# Patient Record
Sex: FEMALE | Race: WHITE | NOT HISPANIC OR LATINO | Employment: OTHER | URBAN - METROPOLITAN AREA
[De-identification: names, ages, dates, MRNs, and addresses within clinical notes are randomized per-mention and may not be internally consistent; named-entity substitution may affect disease eponyms.]

---

## 2023-02-18 ENCOUNTER — APPOINTMENT (OUTPATIENT)
Dept: GENERAL RADIOLOGY | Facility: CLINIC | Age: 88
End: 2023-02-18
Attending: FAMILY MEDICINE
Payer: MEDICARE

## 2023-02-18 ENCOUNTER — HOSPITAL ENCOUNTER (OUTPATIENT)
Facility: CLINIC | Age: 88
Setting detail: OBSERVATION
Discharge: HOME HEALTH | End: 2023-02-20
Attending: FAMILY MEDICINE | Admitting: INTERNAL MEDICINE
Payer: MEDICARE

## 2023-02-18 ENCOUNTER — HOSPITAL ENCOUNTER (EMERGENCY)
Facility: CLINIC | Age: 88
Discharge: HOME | End: 2023-02-18
Attending: FAMILY MEDICINE | Admitting: FAMILY MEDICINE
Payer: MEDICARE

## 2023-02-18 VITALS
OXYGEN SATURATION: 93 % | SYSTOLIC BLOOD PRESSURE: 206 MMHG | HEART RATE: 60 BPM | TEMPERATURE: 98.5 F | RESPIRATION RATE: 16 BRPM | DIASTOLIC BLOOD PRESSURE: 94 MMHG

## 2023-02-18 DIAGNOSIS — Z78.9 UNABLE TO CARE FOR SELF: ICD-10-CM

## 2023-02-18 DIAGNOSIS — M25.561 RIGHT KNEE PAIN, UNSPECIFIED CHRONICITY: ICD-10-CM

## 2023-02-18 DIAGNOSIS — Z74.1 PERSONAL CARE IMPAIRMENT: ICD-10-CM

## 2023-02-18 DIAGNOSIS — Z79.01 CURRENT USE OF LONG TERM ANTICOAGULATION: Primary | ICD-10-CM

## 2023-02-18 DIAGNOSIS — M25.561 CHRONIC PAIN OF RIGHT KNEE: ICD-10-CM

## 2023-02-18 DIAGNOSIS — G89.29 CHRONIC PAIN OF RIGHT KNEE: ICD-10-CM

## 2023-02-18 LAB
ANION GAP SERPL CALCULATED.3IONS-SCNC: 9 MMOL/L (ref 7–15)
BASOPHILS # BLD AUTO: 0 10E3/UL (ref 0–0.2)
BASOPHILS NFR BLD AUTO: 1 %
BUN SERPL-MCNC: 29.4 MG/DL (ref 8–23)
CALCIUM SERPL-MCNC: 9.2 MG/DL (ref 8.2–9.6)
CHLORIDE SERPL-SCNC: 104 MMOL/L (ref 98–107)
CREAT SERPL-MCNC: 0.78 MG/DL (ref 0.51–0.95)
DEPRECATED HCO3 PLAS-SCNC: 28 MMOL/L (ref 22–29)
EOSINOPHIL # BLD AUTO: 0.1 10E3/UL (ref 0–0.7)
EOSINOPHIL NFR BLD AUTO: 1 %
ERYTHROCYTE [DISTWIDTH] IN BLOOD BY AUTOMATED COUNT: 12.4 % (ref 10–15)
GFR SERPL CREATININE-BSD FRML MDRD: 68 ML/MIN/1.73M2
GLUCOSE SERPL-MCNC: 107 MG/DL (ref 70–99)
HCT VFR BLD AUTO: 35.1 % (ref 35–47)
HGB BLD-MCNC: 11.4 G/DL (ref 11.7–15.7)
IMM GRANULOCYTES # BLD: 0 10E3/UL
IMM GRANULOCYTES NFR BLD: 0 %
INR PPP: 2.78 (ref 0.85–1.15)
LYMPHOCYTES # BLD AUTO: 1.1 10E3/UL (ref 0.8–5.3)
LYMPHOCYTES NFR BLD AUTO: 14 %
MCH RBC QN AUTO: 32.5 PG (ref 26.5–33)
MCHC RBC AUTO-ENTMCNC: 32.5 G/DL (ref 31.5–36.5)
MCV RBC AUTO: 100 FL (ref 78–100)
MONOCYTES # BLD AUTO: 0.6 10E3/UL (ref 0–1.3)
MONOCYTES NFR BLD AUTO: 8 %
NEUTROPHILS # BLD AUTO: 6.1 10E3/UL (ref 1.6–8.3)
NEUTROPHILS NFR BLD AUTO: 76 %
NRBC # BLD AUTO: 0 10E3/UL
NRBC BLD AUTO-RTO: 0 /100
PLATELET # BLD AUTO: 206 10E3/UL (ref 150–450)
POTASSIUM SERPL-SCNC: 4.5 MMOL/L (ref 3.4–5.3)
RBC # BLD AUTO: 3.51 10E6/UL (ref 3.8–5.2)
SODIUM SERPL-SCNC: 141 MMOL/L (ref 136–145)
WBC # BLD AUTO: 8 10E3/UL (ref 4–11)

## 2023-02-18 PROCEDURE — 80048 BASIC METABOLIC PNL TOTAL CA: CPT | Performed by: FAMILY MEDICINE

## 2023-02-18 PROCEDURE — G0378 HOSPITAL OBSERVATION PER HR: HCPCS

## 2023-02-18 PROCEDURE — 96374 THER/PROPH/DIAG INJ IV PUSH: CPT | Performed by: FAMILY MEDICINE

## 2023-02-18 PROCEDURE — 250N000011 HC RX IP 250 OP 636: Performed by: INTERNAL MEDICINE

## 2023-02-18 PROCEDURE — 36415 COLL VENOUS BLD VENIPUNCTURE: CPT | Performed by: FAMILY MEDICINE

## 2023-02-18 PROCEDURE — 96375 TX/PRO/DX INJ NEW DRUG ADDON: CPT | Performed by: FAMILY MEDICINE

## 2023-02-18 PROCEDURE — 250N000013 HC RX MED GY IP 250 OP 250 PS 637: Performed by: FAMILY MEDICINE

## 2023-02-18 PROCEDURE — 73562 X-RAY EXAM OF KNEE 3: CPT | Mod: RT

## 2023-02-18 PROCEDURE — 85025 COMPLETE CBC W/AUTO DIFF WBC: CPT | Performed by: FAMILY MEDICINE

## 2023-02-18 PROCEDURE — 99221 1ST HOSP IP/OBS SF/LOW 40: CPT | Mod: AI | Performed by: INTERNAL MEDICINE

## 2023-02-18 PROCEDURE — 250N000011 HC RX IP 250 OP 636: Performed by: FAMILY MEDICINE

## 2023-02-18 PROCEDURE — 85610 PROTHROMBIN TIME: CPT | Performed by: FAMILY MEDICINE

## 2023-02-18 PROCEDURE — 99285 EMERGENCY DEPT VISIT HI MDM: CPT | Mod: 25 | Performed by: FAMILY MEDICINE

## 2023-02-18 PROCEDURE — 99285 EMERGENCY DEPT VISIT HI MDM: CPT | Performed by: FAMILY MEDICINE

## 2023-02-18 PROCEDURE — 96374 THER/PROPH/DIAG INJ IV PUSH: CPT

## 2023-02-18 PROCEDURE — 99283 EMERGENCY DEPT VISIT LOW MDM: CPT | Performed by: FAMILY MEDICINE

## 2023-02-18 RX ORDER — LISINOPRIL 20 MG/1
20 TABLET ORAL 2 TIMES DAILY
COMMUNITY
Start: 2022-05-31

## 2023-02-18 RX ORDER — SENNOSIDES 8.6 MG
1300 CAPSULE ORAL 2 TIMES DAILY
COMMUNITY
Start: 2021-10-08

## 2023-02-18 RX ORDER — HYDROMORPHONE HCL IN WATER/PF 6 MG/30 ML
0.2 PATIENT CONTROLLED ANALGESIA SYRINGE INTRAVENOUS ONCE
Status: COMPLETED | OUTPATIENT
Start: 2023-02-18 | End: 2023-02-18

## 2023-02-18 RX ORDER — ACETAMINOPHEN 500 MG
1000 TABLET ORAL ONCE
Status: COMPLETED | OUTPATIENT
Start: 2023-02-18 | End: 2023-02-18

## 2023-02-18 RX ORDER — LACTULOSE 10 G/15ML
30 SOLUTION ORAL DAILY PRN
COMMUNITY
Start: 2021-11-18

## 2023-02-18 RX ORDER — CARVEDILOL 6.25 MG/1
6.25 TABLET ORAL ONCE
Status: COMPLETED | OUTPATIENT
Start: 2023-02-18 | End: 2023-02-18

## 2023-02-18 RX ORDER — MORPHINE SULFATE 2 MG/ML
2 INJECTION, SOLUTION INTRAMUSCULAR; INTRAVENOUS EVERY 4 HOURS PRN
Status: COMPLETED | OUTPATIENT
Start: 2023-02-18 | End: 2023-02-18

## 2023-02-18 RX ORDER — ACETAMINOPHEN 650 MG/1
650 SUPPOSITORY RECTAL EVERY 6 HOURS PRN
Status: DISCONTINUED | OUTPATIENT
Start: 2023-02-18 | End: 2023-02-20 | Stop reason: HOSPADM

## 2023-02-18 RX ORDER — KETOCONAZOLE 20 MG/G
CREAM TOPICAL PRN
COMMUNITY
Start: 2021-11-08 | End: 2024-01-01

## 2023-02-18 RX ORDER — ONDANSETRON 2 MG/ML
4 INJECTION INTRAMUSCULAR; INTRAVENOUS EVERY 6 HOURS PRN
Status: DISCONTINUED | OUTPATIENT
Start: 2023-02-18 | End: 2023-02-20 | Stop reason: HOSPADM

## 2023-02-18 RX ORDER — FLECAINIDE ACETATE 50 MG/1
1 TABLET ORAL DAILY
Status: ON HOLD | COMMUNITY
Start: 2022-05-23 | End: 2023-02-20

## 2023-02-18 RX ORDER — AMOXICILLIN 250 MG
1 CAPSULE ORAL
COMMUNITY

## 2023-02-18 RX ORDER — WARFARIN SODIUM 7.5 MG/1
3.75 TABLET ORAL AT BEDTIME
COMMUNITY
End: 2024-01-01

## 2023-02-18 RX ORDER — FENTANYL CITRATE 50 UG/ML
25 INJECTION, SOLUTION INTRAMUSCULAR; INTRAVENOUS
Status: DISCONTINUED | OUTPATIENT
Start: 2023-02-18 | End: 2023-02-18 | Stop reason: HOSPADM

## 2023-02-18 RX ORDER — ACETAMINOPHEN 325 MG/1
650 TABLET ORAL EVERY 6 HOURS PRN
Status: DISCONTINUED | OUTPATIENT
Start: 2023-02-18 | End: 2023-02-20 | Stop reason: HOSPADM

## 2023-02-18 RX ORDER — LISINOPRIL 10 MG/1
20 TABLET ORAL ONCE
Status: COMPLETED | OUTPATIENT
Start: 2023-02-18 | End: 2023-02-18

## 2023-02-18 RX ORDER — ONDANSETRON 4 MG/1
4 TABLET, ORALLY DISINTEGRATING ORAL EVERY 6 HOURS PRN
Status: DISCONTINUED | OUTPATIENT
Start: 2023-02-18 | End: 2023-02-20 | Stop reason: HOSPADM

## 2023-02-18 RX ORDER — HYDROCODONE BITARTRATE AND ACETAMINOPHEN 5; 325 MG/1; MG/1
1 TABLET ORAL
Qty: 12 TABLET | Refills: 0 | Status: SHIPPED | OUTPATIENT
Start: 2023-02-18

## 2023-02-18 RX ORDER — NALOXONE HYDROCHLORIDE 0.4 MG/ML
0.4 INJECTION, SOLUTION INTRAMUSCULAR; INTRAVENOUS; SUBCUTANEOUS
Status: DISCONTINUED | OUTPATIENT
Start: 2023-02-18 | End: 2023-02-20 | Stop reason: HOSPADM

## 2023-02-18 RX ORDER — WARFARIN SODIUM 2.5 MG/1
2.5 TABLET ORAL AT BEDTIME
COMMUNITY
Start: 2022-12-20 | End: 2023-06-18

## 2023-02-18 RX ORDER — ALBUTEROL SULFATE 90 UG/1
4 AEROSOL, METERED RESPIRATORY (INHALATION)
COMMUNITY
Start: 2021-10-11

## 2023-02-18 RX ORDER — CARVEDILOL 6.25 MG/1
6.25 TABLET ORAL 2 TIMES DAILY WITH MEALS
Status: DISCONTINUED | OUTPATIENT
Start: 2023-02-19 | End: 2023-02-20 | Stop reason: HOSPADM

## 2023-02-18 RX ORDER — NALOXONE HYDROCHLORIDE 0.4 MG/ML
0.2 INJECTION, SOLUTION INTRAMUSCULAR; INTRAVENOUS; SUBCUTANEOUS
Status: DISCONTINUED | OUTPATIENT
Start: 2023-02-18 | End: 2023-02-20 | Stop reason: HOSPADM

## 2023-02-18 RX ORDER — HYDRALAZINE HYDROCHLORIDE 10 MG/1
10 TABLET, FILM COATED ORAL 3 TIMES DAILY
Status: ON HOLD | COMMUNITY
Start: 2022-05-31 | End: 2023-02-20

## 2023-02-18 RX ORDER — LORATADINE 5 MG/5 ML
SOLUTION, ORAL ORAL PRN
COMMUNITY
Start: 2021-07-13

## 2023-02-18 RX ORDER — CARVEDILOL 6.25 MG/1
6.25 TABLET ORAL 2 TIMES DAILY WITH MEALS
COMMUNITY
Start: 2022-05-31 | End: 2024-01-01 | Stop reason: DRUGHIGH

## 2023-02-18 RX ORDER — POLYETHYLENE GLYCOL 3350 17 G/17G
17 POWDER, FOR SOLUTION ORAL DAILY PRN
COMMUNITY
Start: 2021-10-08

## 2023-02-18 RX ORDER — HYDRALAZINE HYDROCHLORIDE 10 MG/1
10 TABLET, FILM COATED ORAL 3 TIMES DAILY
Status: DISCONTINUED | OUTPATIENT
Start: 2023-02-19 | End: 2023-02-20 | Stop reason: HOSPADM

## 2023-02-18 RX ORDER — LISINOPRIL 10 MG/1
20 TABLET ORAL 2 TIMES DAILY
Status: DISCONTINUED | OUTPATIENT
Start: 2023-02-19 | End: 2023-02-20 | Stop reason: HOSPADM

## 2023-02-18 RX ADMIN — MORPHINE SULFATE 2 MG: 2 INJECTION, SOLUTION INTRAMUSCULAR; INTRAVENOUS at 23:42

## 2023-02-18 RX ADMIN — HYDROMORPHONE HYDROCHLORIDE 0.2 MG: 0.2 INJECTION, SOLUTION INTRAMUSCULAR; INTRAVENOUS; SUBCUTANEOUS at 09:23

## 2023-02-18 RX ADMIN — ACETAMINOPHEN 1000 MG: 500 TABLET ORAL at 07:32

## 2023-02-18 RX ADMIN — Medication: at 22:54

## 2023-02-18 RX ADMIN — LISINOPRIL 20 MG: 10 TABLET ORAL at 09:23

## 2023-02-18 RX ADMIN — FENTANYL CITRATE 25 MCG: 50 INJECTION INTRAMUSCULAR; INTRAVENOUS at 07:33

## 2023-02-18 RX ADMIN — CARVEDILOL 6.25 MG: 6.25 TABLET, FILM COATED ORAL at 09:46

## 2023-02-18 ASSESSMENT — ACTIVITIES OF DAILY LIVING (ADL)
ADLS_ACUITY_SCORE: 35
ADLS_ACUITY_SCORE: 39
DEPENDENT_IADLS:: CLEANING;COOKING;LAUNDRY;SHOPPING;MEAL PREPARATION;MEDICATION MANAGEMENT;MONEY MANAGEMENT;TRANSPORTATION
ADLS_ACUITY_SCORE: 35

## 2023-02-18 NOTE — ED NOTES
Updated patient's niece, Paz (POA) and Alexy from [facility]. Both stated patient has services at the assisted living to ensure her safety and well-being, including options to add services as needed. Both updated on social work consult to help with resources for patient.

## 2023-02-18 NOTE — ED NOTES
ACE wrap applied to right knee. Pt requested second wrap around lower leg to help with pain. CMS intact.

## 2023-02-18 NOTE — LETTER
"Transition Communication Hand-off for Care Transitions to Next Level of Care Provider    Name: Radha Hennessy  : 1924  MRN #: [id number]  Primary Care Provider: Physician No Ref-Primary     Primary Clinic: No address on file     Reason for Hospitalization:  Right knee pain, unspecified chronicity [M25.561]  Unable to care for self [Z78.9]  Admit Date/Time: 2023  7:55 PM  Discharge Date: ***  Payor Source: Payor: [facility] / Plan: [facility] MEDICARE ADVANTAGE / Product Type: Medicare /     Readmission Assessment Measure (LAILA) Risk Score/category: ***    Plan of Care Goals/Milestone Events:   Patient Concerns: ***   Patient Goals:   Short-term ***   Long-term ***   Medical Goals   Short-term ***   Long-term ***         Reason for Communication Hand-off Referral: {CCREASONCMCTNHANDOFFREFERRALCTS:98560}    Discharge Plan:       Concern for non-adherence with plan of care:   Y/N ***  Discharge Needs Assessment:  Needs    Flowsheet Row Most Recent Value   Equipment Currently Used at Home grab bar, toilet, raised toilet seat, wheelchair, manual, other (see comments)          Already enrolled in Tele-monitoring program and name of program:  ***  Follow-up specialty is recommended: {YES / NO:30921::\"Yes\"}    Follow-up plan:  No future appointments.    Any outstanding tests or procedures:              Key Recommendations:      Grecia Cox    AVS/Discharge Summary is the source of truth; this is a helpful guide for improved communication of patient story          "

## 2023-02-18 NOTE — ED TRIAGE NOTES
Patient arrives via EMS for concerns of right knee pain. States she was reaching up to change a light when she felt her knee pop, stopped what she was doing and noted the pain starts mid-femur and goes down to the foot. No notable swelling or bruising to extremity, is tender to touch.

## 2023-02-18 NOTE — CONSULTS
Care Management Initial Consult    General Information  Assessment completed with: Caregiver, Family, Paz  Type of CM/SW Visit: Initial Assessment    Primary Care Provider verified and updated as needed:     Readmission within the last 30 days:        Reason for Consult: discharge planning  Advance Care Planning:         Communication Assessment  Patient's communication style: spoken language (English or Bilingual)           Cognitive  Cognitive/Neuro/Behavioral:                        Living Environment:   People in home: spouse     Current living Arrangements: assisted living  Name of Facility: [facility]   Able to return to prior arrangements: yes       Family/Social Support:  Care provided by: self, other (see comments) (staff)  Provides care for:    Marital Status:   Other (specify) (Niece)          Description of Support System: Supportive, Involved    Support Assessment: Adequate family and caregiver support    Current Resources:   Patient receiving home care services: No     Community Resources: None  Equipment currently used at home: walker, rolling  Supplies currently used at home:      Employment/Financial:  Employment Status: retired        Financial Concerns:         Lifestyle & Psychosocial Needs:  Social Determinants of Health     Tobacco Use: Not on file   Alcohol Use: Not on file   Financial Resource Strain: Not on file   Food Insecurity: Not on file   Transportation Needs: Not on file   Physical Activity: Not on file   Stress: Not on file   Social Connections: Not on file   Intimate Partner Violence: Not on file   Depression: Not on file   Housing Stability: Not on file       Functional Status:  Prior to admission patient needed assistance:   Dependent ADLs:: Ambulation-walker  Dependent IADLs:: Cleaning, Cooking, Laundry, Shopping, Meal Preparation, Medication Management, Money Management, Transportation       Mental Health Status:          Chemical Dependency Status:               Values/Beliefs:  Spiritual, Cultural Beliefs, Quaker Practices, Values that affect care: no               Additional Information:  Referral received for placement/resources/transportation.      Patient lives at [facility] with her .  They just moved to [facility] 3 wks ago.    Spoke with niece, Paz.  Paz states they are angry about moving from their home.  Patient is giving staff a hard time at the [facility].  Patient is refusing to pay for increased cares.  Paz has been trying to reason with them that they need to be in the [facility], but has not been successful.  Paz is overwhelmed.  Paz states she is also [facility].  Patient's spouse is a .    Patient not needing to admit to the hospital.  Alexy, at [facility], is ok taking patient back today.  Alexy will try again to increase services with patient so she gets more cares.  Patient will need to agree.    Arranged transport back to [facility] with [facility].  [facility] to transport @ 1330.  Paz is aware of the private cost.  Pollo Moyer, will contact Paz for payment by phone.    JOHN Hernandez nurse, updated on plan.    DANTE Holloway  [facility] [phone]/ [facility] [phone]  Care Management

## 2023-02-18 NOTE — DISCHARGE INSTRUCTIONS
1.  You probably have a little bit more bleeding into your joint which is causing your worsening pain.  Keep a wrap on your knee, ice it and continue to use Tylenol for pain.  I have prescribed you some hydrocodone that I would only use at night to help you sleep.  I have put a referral in for orthopedics for you to consider a possible joint injection as this may help with your pain also.

## 2023-02-19 ENCOUNTER — APPOINTMENT (OUTPATIENT)
Dept: PHYSICAL THERAPY | Facility: CLINIC | Age: 88
End: 2023-02-19
Attending: INTERNAL MEDICINE
Payer: MEDICARE

## 2023-02-19 LAB
ALBUMIN SERPL BCG-MCNC: 3.7 G/DL (ref 3.5–5.2)
ALP SERPL-CCNC: 61 U/L (ref 35–104)
ALT SERPL W P-5'-P-CCNC: 9 U/L (ref 10–35)
ANION GAP SERPL CALCULATED.3IONS-SCNC: 11 MMOL/L (ref 7–15)
AST SERPL W P-5'-P-CCNC: 26 U/L (ref 10–35)
BILIRUB SERPL-MCNC: 0.7 MG/DL
BUN SERPL-MCNC: 25.4 MG/DL (ref 8–23)
CALCIUM SERPL-MCNC: 9 MG/DL (ref 8.2–9.6)
CHLORIDE SERPL-SCNC: 105 MMOL/L (ref 98–107)
CREAT SERPL-MCNC: 0.67 MG/DL (ref 0.51–0.95)
DEPRECATED HCO3 PLAS-SCNC: 25 MMOL/L (ref 22–29)
ERYTHROCYTE [DISTWIDTH] IN BLOOD BY AUTOMATED COUNT: 12.4 % (ref 10–15)
GFR SERPL CREATININE-BSD FRML MDRD: 79 ML/MIN/1.73M2
GLUCOSE SERPL-MCNC: 93 MG/DL (ref 70–99)
HCT VFR BLD AUTO: 35.7 % (ref 35–47)
HGB BLD-MCNC: 11.3 G/DL (ref 11.7–15.7)
INR PPP: 2.52 (ref 0.85–1.15)
MCH RBC QN AUTO: 31.9 PG (ref 26.5–33)
MCHC RBC AUTO-ENTMCNC: 31.7 G/DL (ref 31.5–36.5)
MCV RBC AUTO: 101 FL (ref 78–100)
PLATELET # BLD AUTO: 197 10E3/UL (ref 150–450)
POTASSIUM SERPL-SCNC: 3.9 MMOL/L (ref 3.4–5.3)
PROT SERPL-MCNC: 6 G/DL (ref 6.4–8.3)
RBC # BLD AUTO: 3.54 10E6/UL (ref 3.8–5.2)
SODIUM SERPL-SCNC: 141 MMOL/L (ref 136–145)
WBC # BLD AUTO: 5.8 10E3/UL (ref 4–11)

## 2023-02-19 PROCEDURE — 85610 PROTHROMBIN TIME: CPT | Performed by: INTERNAL MEDICINE

## 2023-02-19 PROCEDURE — G0378 HOSPITAL OBSERVATION PER HR: HCPCS

## 2023-02-19 PROCEDURE — 80053 COMPREHEN METABOLIC PANEL: CPT | Performed by: INTERNAL MEDICINE

## 2023-02-19 PROCEDURE — 97530 THERAPEUTIC ACTIVITIES: CPT | Mod: GP | Performed by: PHYSICAL THERAPIST

## 2023-02-19 PROCEDURE — 97161 PT EVAL LOW COMPLEX 20 MIN: CPT | Mod: GP | Performed by: PHYSICAL THERAPIST

## 2023-02-19 PROCEDURE — 99231 SBSQ HOSP IP/OBS SF/LOW 25: CPT | Performed by: HOSPITALIST

## 2023-02-19 PROCEDURE — 250N000013 HC RX MED GY IP 250 OP 250 PS 637: Performed by: INTERNAL MEDICINE

## 2023-02-19 PROCEDURE — 36415 COLL VENOUS BLD VENIPUNCTURE: CPT | Performed by: INTERNAL MEDICINE

## 2023-02-19 PROCEDURE — 85027 COMPLETE CBC AUTOMATED: CPT | Performed by: INTERNAL MEDICINE

## 2023-02-19 PROCEDURE — 250N000013 HC RX MED GY IP 250 OP 250 PS 637: Performed by: HOSPITALIST

## 2023-02-19 RX ORDER — OXYCODONE AND ACETAMINOPHEN 5; 325 MG/1; MG/1
1 TABLET ORAL
Status: DISCONTINUED | OUTPATIENT
Start: 2023-02-19 | End: 2023-02-20 | Stop reason: HOSPADM

## 2023-02-19 RX ORDER — BISACODYL 10 MG
10 SUPPOSITORY, RECTAL RECTAL DAILY PRN
Status: DISCONTINUED | OUTPATIENT
Start: 2023-02-19 | End: 2023-02-20 | Stop reason: HOSPADM

## 2023-02-19 RX ORDER — AMOXICILLIN 250 MG
1 CAPSULE ORAL 2 TIMES DAILY PRN
Status: DISCONTINUED | OUTPATIENT
Start: 2023-02-19 | End: 2023-02-20 | Stop reason: HOSPADM

## 2023-02-19 RX ORDER — OXYCODONE AND ACETAMINOPHEN 5; 325 MG/1; MG/1
1 TABLET ORAL
Status: COMPLETED | OUTPATIENT
Start: 2023-02-19 | End: 2023-02-19

## 2023-02-19 RX ORDER — MORPHINE SULFATE 2 MG/ML
2 INJECTION, SOLUTION INTRAMUSCULAR; INTRAVENOUS EVERY 4 HOURS PRN
Status: DISCONTINUED | OUTPATIENT
Start: 2023-02-19 | End: 2023-02-20 | Stop reason: HOSPADM

## 2023-02-19 RX ORDER — MORPHINE SULFATE 2 MG/ML
2 INJECTION, SOLUTION INTRAMUSCULAR; INTRAVENOUS EVERY 4 HOURS PRN
Status: DISCONTINUED | OUTPATIENT
Start: 2023-02-19 | End: 2023-02-19

## 2023-02-19 RX ADMIN — OXYCODONE HYDROCHLORIDE AND ACETAMINOPHEN 1 TABLET: 5; 325 TABLET ORAL at 01:59

## 2023-02-19 RX ADMIN — BISACODYL 10 MG: 10 SUPPOSITORY RECTAL at 10:19

## 2023-02-19 RX ADMIN — LISINOPRIL 20 MG: 10 TABLET ORAL at 22:21

## 2023-02-19 RX ADMIN — HYDRALAZINE HYDROCHLORIDE 10 MG: 10 TABLET, FILM COATED ORAL at 08:13

## 2023-02-19 RX ADMIN — LISINOPRIL 20 MG: 10 TABLET ORAL at 08:13

## 2023-02-19 RX ADMIN — ACETAMINOPHEN 650 MG: 325 TABLET ORAL at 22:32

## 2023-02-19 RX ADMIN — ACETAMINOPHEN 650 MG: 325 TABLET ORAL at 08:12

## 2023-02-19 RX ADMIN — WARFARIN SODIUM 3.75 MG: 2.5 TABLET ORAL at 18:46

## 2023-02-19 RX ADMIN — CARVEDILOL 6.25 MG: 6.25 TABLET, FILM COATED ORAL at 08:13

## 2023-02-19 RX ADMIN — HYDRALAZINE HYDROCHLORIDE 10 MG: 10 TABLET, FILM COATED ORAL at 22:21

## 2023-02-19 RX ADMIN — CARVEDILOL 6.25 MG: 6.25 TABLET, FILM COATED ORAL at 18:47

## 2023-02-19 RX ADMIN — SENNOSIDES AND DOCUSATE SODIUM 1 TABLET: 50; 8.6 TABLET ORAL at 22:21

## 2023-02-19 ASSESSMENT — ACTIVITIES OF DAILY LIVING (ADL)
ADLS_ACUITY_SCORE: 44
ADLS_ACUITY_SCORE: 45
ADLS_ACUITY_SCORE: 44
ADLS_ACUITY_SCORE: 45
ADLS_ACUITY_SCORE: 44
ADLS_ACUITY_SCORE: 45
ADLS_ACUITY_SCORE: 44
ADLS_ACUITY_SCORE: 45
DEPENDENT_IADLS:: CLEANING;COOKING;LAUNDRY;SHOPPING;MEAL PREPARATION;MEDICATION MANAGEMENT;MONEY MANAGEMENT;TRANSPORTATION
ADLS_ACUITY_SCORE: 44
ADLS_ACUITY_SCORE: 44

## 2023-02-19 NOTE — CONSULTS
Care Management Initial Consult    General Information  Assessment completed with: Caregiver, Family,    Type of CM/SW Visit: Initial Assessment    Primary Care Provider verified and updated as needed:     Readmission within the last 30 days:        Reason for Consult: discharge planning  Advance Care Planning:          Communication Assessment  Patient's communication style: spoken language (English or Bilingual)    Hearing Difficulty or Deaf: yes        Cognitive  Cognitive/Neuro/Behavioral: WDL                      Living Environment:   People in home: spouse     Current living Arrangements: assisted living  Name of Facility: [facility]   Able to return to prior arrangements: yes     Family/Social Support:  Care provided by: self, other (see comments) (staff)  Provides care for:    Marital Status:   , Facility resident(s)/Staff, Other (specify) (niece)          Description of Support System: Supportive, Involved    Support Assessment: Adequate family and caregiver support    Current Resources:   Patient receiving home care services: No     Community Resources: None  Equipment currently used at home: grab bar, toilet, raised toilet seat, wheelchair, manual, other (see comments)  Supplies currently used at home:      Employment/Financial:  Employment Status: retired        Financial Concerns:         Lifestyle & Psychosocial Needs:  Social Determinants of Health     Tobacco Use: Not on file   Alcohol Use: Not on file   Financial Resource Strain: Not on file   Food Insecurity: Not on file   Transportation Needs: Not on file   Physical Activity: Not on file   Stress: Not on file   Social Connections: Not on file   Intimate Partner Violence: Not on file   Depression: Not on file   Housing Stability: Not on file       Functional Status:  Prior to admission patient needed assistance:   Dependent ADLs:: Wheelchair-independent  Dependent IADLs:: Cleaning, Cooking, Laundry, Shopping, Meal Preparation,  Medication Management, Money Management, Transportation       Mental Health Status:          Chemical Dependency Status:              Values/Beliefs:  Spiritual, Cultural Beliefs, Tenriism Practices, Values that affect care: no               Additional Information:  Referral received for discharge planning.  Initial assessment completed with patient and Paz salazar (yesterday).  Patient was in the ED yesterday and when she returned home, she came back to the ED with continued pain in her knee.    Patient lives at [facility].  She lives with her .  They just moved in about 3 weeks ago.  Patient upset about having to leave her home in the [facility] area.    Physical therapy evaluated and recommended TCU.  However, patient states she does not ambulate at home.  She scoots herself around in a wheelchair.  Therefore, patient is declining TCU and stated she will be able to return to [facility].     Will discuss transport with Paz salazar.    Update 3373- Spoke with patient again.  Patient now states that she spoke to an aide at [facility] who states they can meet patient's need back at the [facility].  Writer attempted to call on-call nurse, Alexy, from [facility] to verify.  Alexy did not answer and her voicemail box is full.    Spoke with patient's Paz salazar.  Paz states understanding that there needs to be a solid discharge plan in place for patient.  Paz plans to talk with Sandra or Alexy at [facility] in the morning and get costs of how much it will be to increase patient's cares at [facility] and whether they can manage her cares.  Paz will call writer after a decision has been in regard to patient returning to [facility] vs TCU.    TCU referrals are pending at:    - [facility] (Main Phone: [phone] Admissions Phone: [phone] Fax: [phone])    - [facility] (Phone: [phone] Fax: [phone])    Update 3380 - Spoke  with TIMUR Tracey, at [facility].  Alexy states they ARE able to manage patient's needs back at [facility].  Alexy will discuss increase in costs with patient's niece, Paz.  Plan for discharge back to [facility] tomorrow morning.  Paz will call with transport time.    DANTE Holloway  [facility] [phone]/ [facility] [phone]  Care Management

## 2023-02-19 NOTE — H&P
[facility]    History and Physical - Hospitalist Service       Date of Admission:  2/18/2023    Assessment & Plan      Radha Hennessy is a 89 year old female admitted on 2/18/2023 for right knee pain.    Right knee pain. Admit to medical observation. Knee XR negative for any fracture. Likely muscular/tendon problem. Will give pain medication and observe, PT/OT and possible placement.    History of paroxysmal afib. Continue home Coumadin (INR goal 2-3).    HTN. Monitor BP and resume home medications.      DVT PPX. SCDs + coumadin.    Code status full code (Will need to confirm in AM again as patient unsure)    Disposition home in 1-2 days    The patient's care was discussed with the bedside nursing staff        Jeffrey Gibson MD  [facility]  Securely message with the Vocera Web Console (learn more here)  Text page via Teliportme Paging/Directory      Visit/Communication Style   Virtual (Video) communication was used to evaluate Radha.  Radha consented to the use of video communication: yes  Video START time: 2300, 2/18/2023  Video STOP time: 2330, 2/18/2023   Patient's location: [facility]   Provider's location during the visit: [facility] Tele-medicine site        ______________________________________________________________________    Chief Complaint   Knee pain    History is obtained from the patient    History of Present Illness   Radha Hennessy is a 89 year old female with past medical history of HTN and paroxysmal afib (on Coumadin), presenting to the ER because of knee pain. Last night, the patient got up, twisted the knee and started to have knee pain. The patient was brought to the ER next morning due to severe pain and inability to move around. The patient was discharged with some oral pain medications but as she was going back home, the pain got worse and the patient did not believe going back is a  good option. The patient decided to go back to the ER, which is where we are now.    Otherwise, the patient denies any fever, chills, nausea, vomiting, diarrhea, chest pain, shortness of breath or coughing.    In our ER,  the patient was hemodynamically stable.  Note in the first ER visit, the patient was hypertensive with BP up to 218/102. Lisinopril + carvedilol given and BP came down. Knee XR came back with no sign of fracture. The patient is currently living in assisted living facility with her  but stated that the facility does not provide good support, especially now with her knee pain and limited mobility. Will likely need placement.    Review of Systems    General: negative for fever, chills, sweats, weakness  Eyes: negative for blurred vision, loss of vision  Ear Nose and Throat: negative for pharyngitis, speech or swallowing difficulties  Respiratory:  negative for sputum production, wheezing, VERDE, pleuritic pain, sob or cough  Cardiology:  negative for chest pain, palpitations, orthopnea, PND, edema, syncope   Gastrointestinal: negative for abdominal pain, nausea, vomiting, diarrhea, constipation, hematemesis, melena or hematochezia  Genitourinary: negative for frequency, urgency, dysuria, hematuria   Neurological: negative for focal weakness, paresthesia    Past Medical History    I have reviewed this patient's medical history and updated it with pertinent information if needed.   No past medical history on file.  Afib    Past Surgical History   I have reviewed this patient's surgical history and updated it with pertinent information if needed.  No past surgical history on file.   Patient unable to recall    Social History   I have reviewed this patient's social history and updated it with pertinent information if needed.       Family History   Patient unable to recall    Prior to Admission Medications   Prior to Admission Medications   Prescriptions Last Dose Informant Patient Reported? Taking?  "  Diaper Rash Products (A+D DIAPER RASH) CREA   Yes No   HYDROcodone-acetaminophen (NORCO) 5-325 MG tablet   No No   Sig: Take 1 tablet by mouth nightly as needed for severe pain (7-10)   acetaminophen (TYLENOL) 650 MG CR tablet   Yes No   Sig: Take 1,300 mg by mouth   albuterol (PROAIR HFA/PROVENTIL HFA/VENTOLIN HFA) 108 (90 Base) MCG/ACT inhaler   Yes No   Sig: Inhale 2 puffs into the lungs   calcium carbonate antacid 1000 MG CHEW   Yes No   Sig: Take 400 mg by mouth   carvedilol (COREG) 6.25 MG tablet   Yes No   Sig: Take 6.25 mg by mouth   cholecalciferol (VITAMIN D3) 25 mcg (1000 units) capsule   Yes No   Sig: Take 1 capsule by mouth every evening   flecainide (TAMBOCOR) 50 MG tablet   Yes No   Sig: Take 1 tablet by mouth   hydrALAZINE (APRESOLINE) 10 MG tablet   Yes No   Sig: Take 10 mg by mouth   ketoconazole (NIZORAL) 2 % external cream   Yes No   lactulose (CHRONULAC) 10 GM/15ML solution   Yes No   Sig: Take 30 mLs by mouth   lisinopril (ZESTRIL) 20 MG tablet   Yes No   Sig: Take 20 mg by mouth   omeprazole (PRILOSEC) 20 MG DR capsule   Yes No   Sig: Take 20 mg by mouth   polyethylene glycol (MIRALAX) 17 GM/Dose powder   Yes No   Sig: Take 17 g by mouth   senna-docusate (SENOKOT-S/PERICOLACE) 8.6-50 MG tablet   Yes No   Sig: Take 2 tablets by mouth   vitamin B-12 (CYANOCOBALAMIN) 1000 MCG tablet   Yes No   Sig: Take 1,000 mcg by mouth   warfarin ANTICOAGULANT (COUMADIN) 2.5 MG tablet   Yes No   Sig: Monday & Friday   warfarin ANTICOAGULANT (COUMADIN) 7.5 MG tablet   Yes No   Sig: Take 7.5 mg by mouth Sunday, Tuesday, Wednesday, Thursday, Saturday      Facility-Administered Medications: None     Allergies   Allergies   Allergen Reactions     Cefuroxime Diarrhea     Hydrochlorothiazide Other (See Comments)     hyponatremia     Nitrofurantoin Other (See Comments)     Dizzy, headache, \"sick all over\"     Oxybutynin Other (See Comments)     Dizziness ,lightheaded, urine retention on 10 mg dose     Sulfa Drugs  "     Amoxicillin Other (See Comments)     Diarrhea       Physical Exam   Vital Signs: Temp: 98.1  F (36.7  C) Temp src: Oral BP: 131/69 Pulse: 60   Resp: 16 SpO2: 93 %      Weight: 134 lbs 11.2 oz      GENERAL: The patient is not in any acute distressed. Awake and alert.  HEENT: Nonicteric sclerae, PERRLA, EOMI. Oropharynx clear. Moist mucous membranes. Conjunctivae appear well perfused.  HEART: Regular rate and rhythm without murmurs. No lower extremities edema.  LUNGS: Clear to auscultation bilaterally. No wheezing, crackles or rhonchi  ABDOMEN: Soft, positive bowel sounds, nontender.  SKIN: No rash, no excessive bruising, petechiae, or purpura.  NEUROLOGIC: AxO x 3.  Cranial nerves II-XII intact without motor/sensory deficit.  Limited movement in right leg due to right knee pain    Data     Recent Labs   Lab 02/18/23  2110 02/18/23  0725   WBC 8.0  --    HGB 11.4*  --      --      --    INR  --  2.78*         Recent Results (from the past 24 hour(s))   XR Knee Right 3 Views    Narrative    EXAM: XR KNEE RIGHT 3 VIEWS  LOCATION: [facility]  DATE/TIME: 2/18/2023 8:00 AM    INDICATION: right knee pain, swelling  COMPARISON: None.      Impression    IMPRESSION: No acute fracture or malalignment. Moderate patellofemoral, mild medial, and mild lateral compartment degenerative changes. Prominent subchondral cyst in the lateral tibial plateau. Large knee joint effusion and chondrocalcinosis. Osteopenia.   Atherosclerosis.

## 2023-02-19 NOTE — ED PROVIDER NOTES
History     Chief Complaint   Patient presents with     Leg Pain     Leg Swelling     HPI  Radha Hennessy is a 89 year old female who presents back to the ED this evening with worsening right knee pain.  She was seen earlier this morning after she presented by ambulance.  She had gotten up during the night and twisted her knee and by morning had such severe knee pain she could not get around her apartment.  X-rays were negative for any acute fracture.  They did note a large effusion but she is also on Coumadin and with no large fluid collection on bedside ultrasound elected to avoid aspirating the joint at that time.  She was discharged home on oral med pain medication and even on the way back by [facility], her pain was worsening and she thought she probably made a mistake by leaving.  Unfortunately, there were no beds available in the hospital this morning to even keep her in.    She is a very sharp minded elderly lady who is well educated and most recently lived in her own house on the outskirts of [place] with her elderly .  They had a -type person who would take care of their house and helped them out.      Somewhere along the line, their grandniece decided that they needed 24-hour assistance and she strongly pushed for them to move to [place] to be closer to her residence.  They moved into the [facility] assisted [facility] facility.  She states that it was originally built as an apartment building but since they were not able to fill it up they started advertising it as an assisted living facility but they are not staffed adequately.  She states the staff is composed of some high school kids and odds and ends and they are not up to speed as far as providing service for those that need it.  She estimates that half of the residents there are in wheelchairs and does not feel like there are adequate services available.  They still have their house over in [place] and felt like they were rushed  "into moving to [place].  She is now regretting that move which just occurred it sounds like in the last month or so.  Her  is also in a wheelchair.    She has had chronic problems with that knee according to the family but this is an acute exacerbation and it limits her mobility.  She has exquisite pain in several spots laterally when she touches lightly.  No overlying erythema or warmth.      She has lived quite the life.  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  Came back to the [place] in [place] and her  worked for [employer] developing some of the original computers.  She then moved back to [place] and ultimately went back to college at [employer] I think in her 70s.  She is a very intelligent, well versed sharp minded elderly lady.    INR was 2.78 this morning.  X-ray showed no acute fracture or malalignment.  Moderate patellofemoral, mild medial and mild lateral compartment degenerative changes.  Prominent subchondral cyst in the lateral tibial plateau.  Large knee joint effusion and chondrocalcinosis.  Osteopenia, atherosclerosis.  I reviewed the ED note from this morning as well as the x-ray report.    Allergies:  Allergies   Allergen Reactions     Cefuroxime Diarrhea     Hydrochlorothiazide Other (See Comments)     hyponatremia     Nitrofurantoin Other (See Comments)     Dizzy, headache, \"sick all over\"     Oxybutynin Other (See Comments)     Dizziness ,lightheaded, urine retention on 10 mg dose     Sulfa Drugs      Amoxicillin Other (See Comments)     Diarrhea       Problem List:    There are no problems to display for this patient.       Past Medical History:    No past medical history on file.    Past Surgical History:    No past surgical history on file.    Family History:    No family history on file.    Social History:  Marital Status:   [5]        Medications:    acetaminophen (TYLENOL) 650 MG CR " tablet  albuterol (PROAIR HFA/PROVENTIL HFA/VENTOLIN HFA) 108 (90 Base) MCG/ACT inhaler  calcium carbonate antacid 1000 MG CHEW  carvedilol (COREG) 6.25 MG tablet  cholecalciferol (VITAMIN D3) 25 mcg (1000 units) capsule  Diaper Rash Products (A+D DIAPER RASH) CREA  flecainide (TAMBOCOR) 50 MG tablet  hydrALAZINE (APRESOLINE) 10 MG tablet  HYDROcodone-acetaminophen (NORCO) 5-325 MG tablet  ketoconazole (NIZORAL) 2 % external cream  lactulose (CHRONULAC) 10 GM/15ML solution  lisinopril (ZESTRIL) 20 MG tablet  omeprazole (PRILOSEC) 20 MG DR capsule  polyethylene glycol (MIRALAX) 17 GM/Dose powder  senna-docusate (SENOKOT-S/PERICOLACE) 8.6-50 MG tablet  vitamin B-12 (CYANOCOBALAMIN) 1000 MCG tablet  warfarin ANTICOAGULANT (COUMADIN) 2.5 MG tablet  warfarin ANTICOAGULANT (COUMADIN) 7.5 MG tablet          Review of Systems   All other systems reviewed and are negative.      Physical Exam   BP: 131/69  Pulse: 60  Temp: 98.1  F (36.7  C)  Resp: 16  Weight: 61.1 kg (134 lb 11.2 oz)  SpO2: 93 %      Physical Exam  Constitutional:       General: She is not in acute distress.     Appearance: Normal appearance.   Pulmonary:      Effort: Pulmonary effort is normal. No respiratory distress.   Musculoskeletal:      Right knee: Swelling and effusion present. Tenderness (lateral) present.   Neurological:      Mental Status: She is alert.         ED Course                 Procedures              Critical Care time:  none               Results for orders placed or performed during the hospital encounter of 02/18/23 (from the past 24 hour(s))   CBC with platelets differential    Narrative    The following orders were created for panel order CBC with platelets differential.  Procedure                               Abnormality         Status                     ---------                               -----------         ------                     CBC with platelets and d...[[id number]]                      In process                    Please view results for these tests on the individual orders.       Medications - No data to display    Assessments & Plan (with Medical Decision Making)  89-year-old female who is with her elderly  in an apparent assisted living facility in [place] but she does not feel it has adequate services.  She injured her right knee during the night last night when she got up to turn on the light.  She planted and twisted and felt immediate pain and may have heard a pop.  X-rays were negative for fracture.  Suspect internal derangement of the meniscus or ACL.  She is on Coumadin with an INR of 2.78 so they elected to defer joint aspiration.  Unclear if this swelling is all chronic but I suspect some of it is acute.  In any event, she is unable to get around at home and take care of herself because of the acute pain.  She will need to stay in the hospital and have social service visit with her and see if they can figure out a different living arrangement as she feels that the place they are in is not adequately staffed.  She felt coerced and rushed by their great-niece to move out of their own home to this new assisted living facility.   I spoke with Dr. Gibson, hospitalist on call.  He has assumed her care will write orders.    I then spoke with Dr. Chao from orthopedics.  He is recommending an Ace wrap for compression and otherwise conservative management.  She can follow-up in clinic as an outpatient if persistent problems but no acute interventions indicated at this time.     I have reviewed the nursing notes.    I have reviewed the findings, diagnosis, plan and need for follow up with the patient.       ED to Inpatient Handoff:    Discussed with Dr. Gibson at 2051  Patient accepted for Observation Stay  Pending studies include CBC, basic  Code Status: Not Addressed           Medical Decision Making  The patient's presentation is strongly suggestive of an acute complicated injury.    The patient's  evaluation involved:  review of external note(s) from 1 sources (see separate area of note for details)  ordering and/or review of 2 test(s) in this encounter (CBC, basic)  review of 1 test result(s) ordered prior to this encounter (X-ray from this morning)    The patient's management involved a decision regarding hospitalization.        New Prescriptions    No medications on file       Final diagnoses:   Right knee pain, unspecified chronicity - chronic with acute injury, suspect meniscus or ACL injury   Unable to care for self - due to acute pain       2/18/2023   [facility] EMERGENCY DEPT     Deonte Morris MD  02/18/23 3838

## 2023-02-19 NOTE — PROGRESS NOTES
[facility]      Outpatient Physical Therapy Evaluation  PLAN OF TREATMENT FOR OUTPATIENT REHABILITATION  (COMPLETE FOR INITIAL CLAIMS ONLY)  Patient's Last Name, First Name, M.I.  YOB: 1924  Radha Hennessy                        Provider's Name  [facility] Medical Record No.  [id number]                               Onset Date:  2/18/23   Start of Care Date: 2/19/23     Type: Physical Therapy Medical Diagnosis: R knee pain                        Therapy Diagnosis:  R knee pain   Visits from SOC:  1   _________________________________________________________________________________  Plan of Treatment/Functional Goals    Planned Interventions: Transfer training, strengthening     Goals: See Physical Therapy Goals on Care Plan in [employer] electronic health record.    Frequency: 3x/week  Duration: 1 week  _________________________________________________________________________________    I CERTIFY THE NEED FOR THESE SERVICES FURNISHED UNDER        THIS PLAN OF TREATMENT AND WHILE UNDER MY CARE     (Physician co-signature of this document indicates review and certification of the therapy plan).              Certification date from: 2/19/23  Certification date to: 2/26/23    Referring Physician: Jeffrey Gibson MD; Yolanda Jauregui MD           Initial Assessment        See Physical Therapy evaluation dated in Epic electronic health record.

## 2023-02-19 NOTE — PROGRESS NOTES
[facility]    Medicine Progress Note - Hospitalist Service    Date of Admission:  2/18/2023    Assessment & Plan      Radha Hennessy is a 89 year old female admitted on 2/18/2023 for right knee pain.    Right knee pain  Osteoarthritis   - Admit to medical observation.    - Knee XR negative for any fracture.    - started on pain medication   - PT/OT ordered  2/19   - continue pt/ot   - continue pain medications   - placement pending    paroxysmal atrial fibrillation   - continue coreg   - Continue home Coumadin (INR goal 2-3).    Essential hypertension   - Monitor BP    - home medications resumed  2/19   - continue lisinopril   - continue hydralazine   - coreg as above       Diet: Regular Diet Adult    DVT Prophylaxis: Warfarin  Godinez Catheter: Not present  Lines: None     Cardiac Monitoring: None  Code Status: Full Code      Clinically Significant Risk Factors Present on Admission               # Drug Induced Coagulation Defect: home medication list includes an anticoagulant medication    # Hypertension: home medication list includes antihypertensive(s)              Disposition Plan      Expected Discharge Date: 02/19/2023      Destination: home with help/services            Jair Lester MD  Hospitalist Service  [facility]  Securely message with [employer] (more info)  Text page via [employer] Paging/Directory   ______________________________________________________________________    Interval History   Patient reported continued pain with transfers. Otherwise no other concerns or new symptoms. No other concerns reported by RN.    Physical Exam   Vital Signs: Temp: 97.3  F (36.3  C) Temp src: Oral BP: 97/50 Pulse: 65   Resp: 18 SpO2: 97 % O2 Device: None (Room air)    Weight: 134 lbs 11.2 oz    Gen:  no acute distress; lying in bed  Resp:  breathing normally on room air. No rales, wheezing, or stridor  Card:  normal rate, normal rhythm. No murmurs  appreciated  Abd:  Soft, non-tender, non-distended, normoactive bowel sounds are present  Psych:  Alert  Extr:  no edema      Medical Decision Making             Data

## 2023-02-19 NOTE — PHARMACY-ANTICOAGULATION SERVICE
Clinical Pharmacy - Warfarin Dosing Consult     Pharmacy has been consulted to manage this patient s warfarin therapy.  Indication: Atrial Fibrillation  Therapy Goal: INR 2-3  Warfarin Prior to Admission: Yes  Warfarin PTA Regimen: 2.5 mg Monday and Friday, and 3.75 mg all other days  Significant drug interactions: none    INR   Date Value Ref Range Status   02/19/2023 2.52 (H) 0.85 - 1.15 Final   02/18/2023 2.78 (H) 0.85 - 1.15 Final     Will dose according to home routine.    Recommend warfarin 3.75 mg today.  Pharmacy will monitor Radha Hennessy daily and order warfarin doses to achieve specified goal.      Please contact pharmacy as soon as possible if the warfarin needs to be held for a procedure or if the warfarin goals change.      Aubrey Quiñonez [facility],PharmD.........February 19, 2023 9:00 AM

## 2023-02-19 NOTE — PROGRESS NOTES
"S-(situation): Patient registered to Observation. Patient arrived to room 254 via bed from ED.    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 LS clear, skin dry and intact. R knee has trace edema, pt was having 9/10 pain when arrived to floor. Morphine IV was given and 1x dose percocet. Pt resting when checked later.   Pt med rec was completed to best of patients knowledge, pt was not sent here with med list. Facility needing to be contacted in the morning for times when medications to be given.   R-(recommendations): Orders and observation goals reviewed with patient.     Nursing Observation criteria listed below was met:    Skin issues/needs documented:NA  Isolation needs addressed and Signage up: NA  Fall Prevention: Education given and documented: Yes  Education Assessment documented:Yes  Admission Education Documented: Yes  New medication patient education completed and documented (Possible Side Effects of Common Medications handout): Yes  OBS video/handout Reviewed & Documented: Yes  Allergies Reviewed: Yes  Medication Reconciliation Complete: Yes  Home medications if not able to send immediately home with family stored here: NA  Reminder note placed in discharge instructions of home meds: NA  Patient has discharge needs (If yes, please explain): No  Patient discharge preferences addressed and charted on white board:  Yes  Provider notified that patient has arrived to the unit: Yes          " continue anticoagulation please begin lovenox 70mg subcutaneously twice daily

## 2023-02-19 NOTE — PROGRESS NOTES
02/19/23 0845   Appointment Info   Signing Clinician's Name / Credentials (PT) David Nicholas PT   Rehab Comments (PT) OBS, R knee pain   Living Environment   People in Home spouse   Current Living Arrangements assisted living   Home Accessibility no concerns   Living Environment Comments Pt was living with  in house in [place] but has moved into [facility] in [place] to be closer to family. Reports services there are not enough.   Self-Care   Usual Activity Tolerance poor   Current Activity Tolerance poor   Regular Exercise No   Equipment Currently Used at Home grab bar, toilet;raised toilet seat;wheelchair, manual;other (see comments)  (Has 2ww but does not use)   General Information   Onset of Illness/Injury or Date of Surgery 02/18/23   Referring Physician Jeffrey Gibson MD   Patient/Family Therapy Goals Statement (PT) To get back home   Pertinent History of Current Problem (include personal factors and/or comorbidities that impact the POC) Pt is a 89 yo female who presents to PT following a non-falling R knee injury experienced at home. XRays are negative for acute fx, do reveal increased fluid in knee, however. Pt is currently limited in her mobility d/t her pain. At baseline, pt does not walk, reports that she performs all mobility with manual w/c usin her LEs to propel herself. Pt reports that she does not use a walker for any transfers, instead performs squat pivot and sliding transfers to get from w/c to bed or toilet. Pt is independent in mobility at baseline.   Weight-Bearing Status - LLE full weight-bearing   Weight-Bearing Status - RLE full weight-bearing   Cognition   Affect/Mental Status (Cognition) WNL   Orientation Status (Cognition) oriented x 3   Follows Commands (Cognition) WNL   Safety Deficit (Cognition) awareness of need for assistance;insight into deficits/self-awareness;problem-solving;judgment   Cognitive Status Comments Pt very fixated on how she performs functional mobility in the  "home, using manual w/c to roll close to bed or commode and then performing squat pivot or sliding transfer to surfaces. Pt resistive to performing sit to stand and pivot transfers as she doesn't \"do that at home\", difficulty reasoning with her to be agreeable to close approximation assessment with tools available in the hospital. Pt appears well aware of her current living situation and environment, however, seems to lack insight into her baseline functional mobility deficits and how her new injury will exacerbate those deficits. Pt's apparent inability to reason through importance of mobility assessment, even if not exactly like she performs at home, as well as decreased insight into the impact of her injury on her mobility would indicate that she is not as sharp as indicated durin earlier hospital assessments. Concerns for safe return home.   Transfers   Transfers bed-chair transfer;sit-stand transfer   Bed-Chair Transfer   Assistive Device (Bed-Chair Transfers) walker, front-wheeled   Bed-Chair [place] (Transfers) contact guard   Comment, (Bed-Chair Transfer) Pt bearin minimal weight through R LE, unsteady on her feet   Sit-Stand Transfer   Sit-Stand [place] (Transfers) minimum assist (75% patient effort)   Assistive Device (Sit-Stand Transfers) walker, front-wheeled   Comment, (Sit-Stand Transfer) Pt with weakness, does not perform sit to stand at baseline, unable to assess squat pivot with pain in R LE   Clinical Impression   Criteria for Skilled Therapeutic Intervention Yes, treatment indicated   PT Diagnosis (PT) R knee pain   Influenced by the following impairments Pain   Functional limitations due to impairments Impaired functional transfers   Clinical Presentation (PT Evaluation Complexity) Stable/Uncomplicated   Clinical Presentation Rationale Pt with R knee pain, soft tissue injury   Clinical Decision Making (Complexity) low complexity   Planned Therapy Interventions (PT) transfer " training;strengthening   Risk & Benefits of therapy have been explained risks/benefits reviewed;care plan/treatment goals reviewed;patient   Clinical Impression Comments Pt presents with pain in R knee rated 5/10 at rest, increasing to 10/10 with mobility. Pt mobility is very limited with pt bearing minimal weight through R LE durin sit to stand and pivot transfers with 2ww. Pt appears to lack insight into the extent of her baseline deficits and how her new injury impacts those deficits as well as the need for mobility assessment with equipment available at the hospital even if setup is not the same as home. Pt is resistive to mobility assessment as she performs diffierently at home, difficult time reasoning with her to be compliant. Pt shows weakness in her transfers, requires assist from elevated seat position, assist to maintain balance as she pivots with 2ww. Pt would benefit from skilled PT interventions in order to address her weakness, impaired functional mobility, and to assist in d/c planning for safe transition.   PT Total Evaluation Time   PT Eval, Low Complexity Minutes (48294) 15   Physical Therapy Goals   PT Frequency 3x/week   PT Predicted Duration/Target Date for Goal Attainment 02/24/23   PT Goals Transfers   PT: Transfers Supervision/stand-by assist   Interventions   Interventions Quick Adds Therapeutic Activity   Therapeutic Activity   Therapeutic Activities: dynamic activities to improve functional performance Minutes (77412) 25   Symptoms Noted During/After Treatment None   Treatment Detail/Skilled Intervention Safe transfer training: pt needing instruction in safe transfers, resistive to efforts to assess and train as home setup is different. Pt eventually agreeable. Pt needing cues to push from arms of chair, hands on walker, cues to advance her LEs with R LE dragging behind d/t reduced willingness to bear weight through limb. Significant education with pt on safe mobility, the impact of her  injury on her previous baseline mobility, and how her reduced mobility affects her safe d/c. Discussed TCU vs home as she would benefit from strengthening and further pain mgmt to allow for safest transition back home.   PT Discharge Planning   PT Plan Transfer training, strength   PT Discharge Recommendation (DC Rec) Transitional Care Facility   PT Rationale for DC Rec Pt is from [facility] with  who has his own disabilities, difficult to ascertain services, if any, rendered. Pt uses manual w/c at baseline with LEs to propel, reports performing squat pivot or sliding transfers without any AD use. Pt appears to lack insight into her previous baseline deficits, current injury and its impact on her deficits, and is not safe to return home at this time with impaired mobility. Pt would benefit from placement in TCU to address her pain as well as gain strength in functional mobility for greater independence and safety in the home.   PT Brief overview of current status CGA/MIN assist for sit to stand and pivot transfers, reduced willingness to bear weight through R LE   Total Session Time   Timed Code Treatment Minutes 25   Total Session Time (sum of timed and untimed services) 40

## 2023-02-19 NOTE — ED TRIAGE NOTES
Pt presents with right knee pain. Left ankle swelling. Pt was seen earlier today for similar. Pt given 50 mcg of fentanyl per EMS. [facility] [place]      Triage Assessment     Row Name 02/18/23 1958       Triage Assessment (Adult)    Airway WDL WDL       Respiratory WDL    Respiratory WDL WDL       Skin Circulation/Temperature WDL    Skin Circulation/Temperature WDL WDL       Cardiac WDL    Cardiac WDL WDL       Peripheral/Neurovascular WDL    Peripheral Neurovascular WDL WDL       Cognitive/Neuro/Behavioral WDL    Cognitive/Neuro/Behavioral WDL WDL

## 2023-02-20 ENCOUNTER — TELEPHONE (OUTPATIENT)
Dept: ANTICOAGULATION | Facility: CLINIC | Age: 88
End: 2023-02-20
Payer: MEDICARE

## 2023-02-20 ENCOUNTER — TELEPHONE (OUTPATIENT)
Dept: FAMILY MEDICINE | Facility: CLINIC | Age: 88
End: 2023-02-20
Payer: MEDICARE

## 2023-02-20 ENCOUNTER — APPOINTMENT (OUTPATIENT)
Dept: PHYSICAL THERAPY | Facility: CLINIC | Age: 88
End: 2023-02-20
Payer: MEDICARE

## 2023-02-20 VITALS
SYSTOLIC BLOOD PRESSURE: 94 MMHG | BODY MASS INDEX: 24.79 KG/M2 | WEIGHT: 134.7 LBS | RESPIRATION RATE: 16 BRPM | DIASTOLIC BLOOD PRESSURE: 52 MMHG | HEART RATE: 71 BPM | OXYGEN SATURATION: 97 % | TEMPERATURE: 97.5 F | HEIGHT: 62 IN

## 2023-02-20 DIAGNOSIS — Z79.01 CURRENT USE OF LONG TERM ANTICOAGULATION: Primary | ICD-10-CM

## 2023-02-20 LAB
ANION GAP SERPL CALCULATED.3IONS-SCNC: 10 MMOL/L (ref 7–15)
BUN SERPL-MCNC: 28.2 MG/DL (ref 8–23)
CALCIUM SERPL-MCNC: 8.9 MG/DL (ref 8.2–9.6)
CHLORIDE SERPL-SCNC: 105 MMOL/L (ref 98–107)
CREAT SERPL-MCNC: 0.83 MG/DL (ref 0.51–0.95)
DEPRECATED HCO3 PLAS-SCNC: 26 MMOL/L (ref 22–29)
ERYTHROCYTE [DISTWIDTH] IN BLOOD BY AUTOMATED COUNT: 12.4 % (ref 10–15)
GFR SERPL CREATININE-BSD FRML MDRD: 63 ML/MIN/1.73M2
GLUCOSE SERPL-MCNC: 98 MG/DL (ref 70–99)
HCT VFR BLD AUTO: 33.6 % (ref 35–47)
HGB BLD-MCNC: 10.7 G/DL (ref 11.7–15.7)
INR PPP: 2.68 (ref 0.85–1.15)
MCH RBC QN AUTO: 31.8 PG (ref 26.5–33)
MCHC RBC AUTO-ENTMCNC: 31.8 G/DL (ref 31.5–36.5)
MCV RBC AUTO: 100 FL (ref 78–100)
PLATELET # BLD AUTO: 196 10E3/UL (ref 150–450)
POTASSIUM SERPL-SCNC: 3.7 MMOL/L (ref 3.4–5.3)
RBC # BLD AUTO: 3.36 10E6/UL (ref 3.8–5.2)
SODIUM SERPL-SCNC: 141 MMOL/L (ref 136–145)
WBC # BLD AUTO: 6 10E3/UL (ref 4–11)

## 2023-02-20 PROCEDURE — 80048 BASIC METABOLIC PNL TOTAL CA: CPT | Performed by: HOSPITALIST

## 2023-02-20 PROCEDURE — 85610 PROTHROMBIN TIME: CPT | Performed by: INTERNAL MEDICINE

## 2023-02-20 PROCEDURE — 97530 THERAPEUTIC ACTIVITIES: CPT | Mod: GP | Performed by: PHYSICAL THERAPIST

## 2023-02-20 PROCEDURE — 36415 COLL VENOUS BLD VENIPUNCTURE: CPT | Performed by: HOSPITALIST

## 2023-02-20 PROCEDURE — 250N000013 HC RX MED GY IP 250 OP 250 PS 637: Performed by: INTERNAL MEDICINE

## 2023-02-20 PROCEDURE — 85027 COMPLETE CBC AUTOMATED: CPT | Performed by: HOSPITALIST

## 2023-02-20 PROCEDURE — 258N000003 HC RX IP 258 OP 636: Performed by: INTERNAL MEDICINE

## 2023-02-20 PROCEDURE — 250N000013 HC RX MED GY IP 250 OP 250 PS 637: Performed by: HOSPITALIST

## 2023-02-20 PROCEDURE — 99238 HOSP IP/OBS DSCHRG MGMT 30/<: CPT | Performed by: HOSPITALIST

## 2023-02-20 PROCEDURE — G0378 HOSPITAL OBSERVATION PER HR: HCPCS

## 2023-02-20 RX ORDER — LIDOCAINE 4 G/G
1 PATCH TOPICAL EVERY 24 HOURS
Qty: 15 PATCH | Refills: 0 | Status: SHIPPED | OUTPATIENT
Start: 2023-02-20

## 2023-02-20 RX ORDER — WARFARIN SODIUM 2.5 MG/1
2.5 TABLET ORAL
Status: DISCONTINUED | OUTPATIENT
Start: 2023-02-20 | End: 2023-02-20 | Stop reason: HOSPADM

## 2023-02-20 RX ORDER — LIDOCAINE 4 G/G
1 PATCH TOPICAL
Status: DISCONTINUED | OUTPATIENT
Start: 2023-02-20 | End: 2023-02-20 | Stop reason: HOSPADM

## 2023-02-20 RX ORDER — LIDOCAINE 4 G/G
1 PATCH TOPICAL EVERY 24 HOURS
Qty: 15 PATCH | Refills: 0 | Status: SHIPPED | OUTPATIENT
Start: 2023-02-20 | End: 2023-02-20

## 2023-02-20 RX ADMIN — ACETAMINOPHEN 650 MG: 325 TABLET ORAL at 10:04

## 2023-02-20 RX ADMIN — SODIUM CHLORIDE 250 ML: 9 INJECTION, SOLUTION INTRAVENOUS at 03:25

## 2023-02-20 RX ADMIN — LIDOCAINE 1 PATCH: 560 PATCH PERCUTANEOUS; TOPICAL; TRANSDERMAL at 11:29

## 2023-02-20 ASSESSMENT — ACTIVITIES OF DAILY LIVING (ADL)
ADLS_ACUITY_SCORE: 44

## 2023-02-20 NOTE — PLAN OF CARE
PRIMARY DIAGNOSIS: ACUTE PAIN  OUTPATIENT/OBSERVATION GOALS TO BE MET BEFORE DISCHARGE:  1. Pain Status: Improved-controlled with oral pain medications.    2. Return to near baseline physical activity: Yes    3. Cleared for discharge by consultants (if involved): Yes    Discharge Planner Nurse   Safe discharge environment identified: No AL vs TCU  Barriers to discharge: Yes- unable to safely transfer from wheelchair to toilet independently.        Entered by: Rachell Valdes RN 02/19/2023 7:13 PM     Please review provider order for any additional goals.   Nurse to notify provider when observation goals have been met and patient is ready for discharge.

## 2023-02-20 NOTE — PROVIDER NOTIFICATION
Patient insistent on needing Miralax and 2 tablets of sennokot tonight for constipation, regardless of having bowel movement today. Writer messaged Dr. Gibson requesting order per patient request.

## 2023-02-20 NOTE — PLAN OF CARE
S-(situation): Patient discharged to back to [facility] via W/C with [facility]     B-(background): Observation goals met     A-(assessment): Pt is A & O x 4, very [place]. VSS, afebrile, on room air. Pain to right knee which remains swollen, lidocaine patch ordered and place on knee which patient said helped. Lung sounds CTA. Up in W/C in room.    R-(recommendations): Discharge instructions reviewed with patient. Listed belongings gathered and returned to patient.  Patient Education resolved: Yes  New medications-Pt. Has been educated about reason of use and side effects Yes  Home medications returned to patient NA  Medication Bin checked and emptied on discharge Yes

## 2023-02-20 NOTE — PLAN OF CARE
Occupational Therapy: Orders received. Chart reviewed and discussed with care team.? Occupational Therapy not indicated due to Patient is OBSERVATION status - PT assessed and reports no concerns for inpatient OT needs at this time as patient will return to [facility] with support in her home environment and ongoing PT.? Defer discharge recommendations to PT and care team.? Will complete orders.     Thank you for your referral.  Lauren Gómez OTR/SELINA     [facility]  O: [phone]  E: [email or web address]

## 2023-02-20 NOTE — DISCHARGE SUMMARY
[facility]  Hospitalist Discharge Summary      Date of Admission:  2/18/2023  Date of Discharge:  2/20/2023  2:33 PM  Discharging Provider: Jair Lester MD  Discharge Service: Hospitalist Service    Discharge Diagnoses   Knee pain    Follow-ups Needed After Discharge   Follow-up Appointments     Follow-up and recommended labs and tests       Follow up with primary care provider, Physician No Ref-Primary, within 7   days for hospital follow- up.  No follow up labs or test are needed.             Unresulted Labs Ordered in the Past 30 Days of this Admission     No orders found from 1/19/2023 to 2/19/2023.      These results will be followed up by PCP    Discharge Disposition   Discharged to assisted living  Condition at discharge: Stable      Hospital Course      Radha Hennessy is a 89 year old female admitted on 2/18/2023 for right knee pain.    Right knee pain  Osteoarthritis   - Admit to medical observation.    - Knee XR negative for any fracture.    - started on pain medication   - PT/OT ordered  2/20   - pain improved   - lidocaine patch given   - continue tramadol   - return to longterm    paroxysmal atrial fibrillation   - continue coreg   - Continue home Coumadin (INR goal 2-3)   - follow up with coumadin clinic    Essential hypertension   - Monitor BP    - home medications resumed  2/20   - continue lisinopril   - discontinue hydralazine due to hypotension   - coreg as above   - follow up with pcp      Consultations This Hospital Stay   PHYSICAL THERAPY ADULT IP CONSULT  OCCUPATIONAL THERAPY ADULT IP CONSULT  PHARMACY TO DOSE WARFARIN  CARE MANAGEMENT / SOCIAL WORK IP CONSULT    Code Status   Prior    Time Spent on this Encounter   I, Jair Lester MD, personally saw the patient today and spent less than or equal to 30 minutes discharging this patient.       Jair Lester MD  [facility] 2A MEDICAL SURGICAL  [address] [place]  [address]  Phone: [phone]  ______________________________________________________________________    Physical Exam   Vital Signs: Temp: 97.5  F (36.4  C) Temp src: Axillary BP: 94/52 Pulse: 71   Resp: 16 SpO2: 97 % O2 Device: None (Room air)    Weight: 134 lbs 11.2 oz  Gen:  no acute distress; lying in bed  Resp:  breathing normally on room air. No rales, wheezing, or stridor  Card:  normal rate, normal rhythm. No murmurs appreciated  Abd:  Soft, non-tender, non-distended, normoactive bowel sounds are present  Psych:  Alert  Extr:  no edema       Primary Care Physician   Physician No Ref-Primary    Discharge Orders      Physical Therapy Referral      ANTICOAGULATION CLINIC REFERRAL      Reason for your hospital stay    Knee pain     Follow-up and recommended labs and tests     Follow up with primary care provider, Physician No Ref-Primary, within 7 days for hospital follow- up.  No follow up labs or test are needed.     Activity    Your activity upon discharge: activity as tolerated     Diet    Follow this diet upon discharge: Orders Placed This Encounter      Regular Diet Adult       Significant Results and Procedures   Results for orders placed or performed during the hospital encounter of 02/18/23   XR Knee Right 3 Views    Narrative    EXAM: XR KNEE RIGHT 3 VIEWS  LOCATION: [facility]  DATE/TIME: 2/18/2023 8:00 AM    INDICATION: right knee pain, swelling  COMPARISON: None.      Impression    IMPRESSION: No acute fracture or malalignment. Moderate patellofemoral, mild medial, and mild lateral compartment degenerative changes. Prominent subchondral cyst in the lateral tibial plateau. Large knee joint effusion and chondrocalcinosis. Osteopenia.   Atherosclerosis.       Discharge Medications   Discharge Medication List as of 2/20/2023  1:59 PM      CONTINUE these medications which have CHANGED    Details   Lidocaine (LIDOCARE) 4 % Patch Place 1 patch onto the skin every 24 hours  To prevent lidocaine toxicity, patient should be patch free for 12 hrs daily.Disp-15 patch, R-0Local Print         CONTINUE these medications which have NOT CHANGED    Details   acetaminophen (TYLENOL) 650 MG CR tablet Take 1,300 mg by mouth, Historical      albuterol (PROAIR HFA/PROVENTIL HFA/VENTOLIN HFA) 108 (90 Base) MCG/ACT inhaler Inhale 2 puffs into the lungs, Historical      calcium carbonate antacid 1000 MG CHEW Take 400 mg by mouth, Historical      carvedilol (COREG) 6.25 MG tablet Take 6.25 mg by mouth 2 times daily (with meals), Historical      cholecalciferol (VITAMIN D3) 25 mcg (1000 units) capsule Take 1 capsule by mouth every evening, Historical      Diaper Rash Products (A+D DIAPER RASH) CREA Historical      HYDROcodone-acetaminophen (NORCO) 5-325 MG tablet Take 1 tablet by mouth nightly as needed for severe pain (7-10), Disp-12 tablet, R-0, E-Prescribe      ketoconazole (NIZORAL) 2 % external cream Historical      lactulose (CHRONULAC) 10 GM/15ML solution Take 30 mLs by mouth, Historical      lisinopril (ZESTRIL) 20 MG tablet Take 20 mg by mouth 2 times daily, Historical      omeprazole (PRILOSEC) 20 MG DR capsule Take 20 mg by mouth daily, Historical      polyethylene glycol (MIRALAX) 17 GM/Dose powder Take 17 g by mouth, Historical      senna-docusate (SENOKOT-S/PERICOLACE) 8.6-50 MG tablet Take 2 tablets by mouth, Historical      vitamin B-12 (CYANOCOBALAMIN) 1000 MCG tablet Take 1,000 mcg by mouth, Historical      !! warfarin ANTICOAGULANT (COUMADIN) 2.5 MG tablet Take 2.5 mg by mouth twice a week Monday & Friday, Historical      !! warfarin ANTICOAGULANT (COUMADIN) 7.5 MG tablet Take 3.75 mg by mouth Sunday, Tuesday, Wednesday, Thursday, Saturday, Historical       !! - Potential duplicate medications found. Please discuss with provider.      STOP taking these medications       flecainide (TAMBOCOR) 50 MG tablet Comments:   Reason for Stopping:         hydrALAZINE (APRESOLINE) 10 MG tablet  "Comments:   Reason for Stopping:             Allergies   Allergies   Allergen Reactions     Cefuroxime Diarrhea     Hydrochlorothiazide Other (See Comments)     hyponatremia     Nitrofurantoin Other (See Comments)     Dizzy, headache, \"sick all over\"     Oxybutynin Other (See Comments)     Dizziness ,lightheaded, urine retention on 10 mg dose     Sulfa Drugs      Amoxicillin Other (See Comments)     Diarrhea     "

## 2023-02-20 NOTE — DISCHARGE INSTRUCTIONS
The [facility] will call you early this week to schedule your hospital follow-up appointment. If you don't hear from them by Thursday, please call the clinic at [phone] to schedule your appointment

## 2023-02-20 NOTE — TELEPHONE ENCOUNTER
Reason for Call:  Other appointment    Detailed comments: Everett is a nurse from [facility], calling on behalf of Radha to get her scheduled for a follow up appointment for her hospital visit (discharged on 2/20). They would like her to be seen in the next 7 days if possible.    Phone Number Patient can be reached at: Home number on file [phone] (home)    Best Time: anytime after noon    Can we leave a detailed message on this number? YES    Call taken on 2/20/2023 at 2:00 PM by Kassidy Estrada

## 2023-02-20 NOTE — PROGRESS NOTES
Care Management Discharge Note    Discharge Date:  2/20/23     Discharge Disposition: Assisted Living - [facility]    Discharge Services: None    Discharge DME: None    Discharge Transportation: family or friend will provide    Private pay costs discussed: transportation costs    PAS Confirmation Code:  N/A    Patient/family educated on Medicare website which has current facility and service quality ratings:      Education Provided on the Discharge Plan:      Persons Notified of Discharge Plans: Patient and niecePaz.    Patient/Family in Agreement with the Plan: yes    Handoff Referral Completed: Yes    Additional Information:  Patient discharging back to [facility] today.  Updated Sandra, at [facility].    Spoke with patient's niecePza.  Paz states her whole family is ill.  She is requesting van transport be arranged.    [facility] transport arranged for this afternoon.  Paz is aware that transport will be private pay.    Handoff not able to be sent as there is no PCP listed.    DANTE Holloway  [facility] [phone]/ [facility] [phone]  Care Management

## 2023-02-20 NOTE — TELEPHONE ENCOUNTER
Spoke with RNEverett - patient is established at [facility].  They will reach out to make an appointment.    Connie Jacinto XRO/

## 2023-02-20 NOTE — TELEPHONE ENCOUNTER
INR referral received by inpatient physician. Pt's PCP is through [facility] and is already managed by them. Closing ACC episode at this time. Ramona Choi RN, BSN  [facility] Anticoagulation Team

## 2023-02-20 NOTE — PROVIDER NOTIFICATION
Patient's BP 83/56 MAP 64, writer held morning blood pressure medications and requesting review from provider before giving blood pressure medications.

## 2023-02-20 NOTE — PROGRESS NOTES
PRIMARY DIAGNOSIS: ACUTE PAIN  OUTPATIENT/OBSERVATION GOALS TO BE MET BEFORE DISCHARGE:  1. Pain Status: Improved-controlled with oral pain medications.    2. Return to near baseline physical activity: Yes    3. Cleared for discharge by consultants (if involved): Yes    Discharge Planner Nurse   Safe discharge environment identified: No; recently moved to [facility] but may require TCU  Barriers to discharge: Yes patient unable to safely transfer herself from wheelchair to toilet without assistance.        Entered by: Diya Jim RN 02/20/2023 1:24 AM     Please review provider order for any additional goals.   Nurse to notify provider when observation goals have been met and patient is ready for discharge.

## 2023-02-20 NOTE — PHARMACY-ANTICOAGULATION SERVICE
Clinical Pharmacy - Warfarin Dosing Consult     Pharmacy has been consulted to manage this patient s warfarin therapy.  Indication: Atrial Fibrillation  Therapy Goal: INR 2-3  Warfarin Prior to Admission: Yes  Warfarin PTA Regimen: 2.5 mg Monday and Friday, and 3.75 mg all other days  Significant drug interactions: none    INR   Date Value Ref Range Status   02/20/2023 2.68 (H) 0.85 - 1.15 Final   02/19/2023 2.52 (H) 0.85 - 1.15 Final       Recommend warfarin 2.5 mg today.  Pharmacy will monitor Radha Hennessy daily and order warfarin doses to achieve specified goal.      Please contact pharmacy as soon as possible if the warfarin needs to be held for a procedure or if the warfarin goals change.

## 2023-02-20 NOTE — PLAN OF CARE
Physical Therapy Discharge Summary    Reason for therapy discharge:    Discharged to long term care facility.    Progress towards therapy goal(s). See goals on Care Plan in [facility] electronic health record for goal details.  Goals partially met.  Barriers to achieving goals:   limited tolerance for therapy and discharge from facility.    Therapy recommendation(s):    Continued therapy is recommended.  Rationale/Recommendations:  to address impaired mobility and right knee pain for greater safety at home.      Thank you for your referral.  Danya Martin, PT, DPT, ATC, LAT    [facility]  O: [phone]  E: [email or web address]

## 2023-02-22 ENCOUNTER — DOCUMENTATION ONLY (OUTPATIENT)
Dept: OTHER | Facility: CLINIC | Age: 88
End: 2023-02-22
Payer: MEDICARE

## 2023-02-23 ENCOUNTER — APPOINTMENT (OUTPATIENT)
Dept: GENERAL RADIOLOGY | Facility: CLINIC | Age: 88
DRG: 193 | End: 2023-02-23
Attending: EMERGENCY MEDICINE
Payer: MEDICARE

## 2023-02-23 ENCOUNTER — HOSPITAL ENCOUNTER (INPATIENT)
Facility: CLINIC | Age: 88
LOS: 4 days | Discharge: HOME HEALTH | DRG: 193 | End: 2023-02-27
Attending: EMERGENCY MEDICINE | Admitting: FAMILY MEDICINE
Payer: MEDICARE

## 2023-02-23 DIAGNOSIS — J18.9 COMMUNITY ACQUIRED PNEUMONIA, UNSPECIFIED LATERALITY: ICD-10-CM

## 2023-02-23 DIAGNOSIS — R09.02 HYPOXEMIA: ICD-10-CM

## 2023-02-23 DIAGNOSIS — R65.10 SIRS (SYSTEMIC INFLAMMATORY RESPONSE SYNDROME) (H): ICD-10-CM

## 2023-02-23 DIAGNOSIS — Z11.52 ENCOUNTER FOR SCREENING LABORATORY TESTING FOR SEVERE ACUTE RESPIRATORY SYNDROME CORONAVIRUS 2 (SARS-COV-2): ICD-10-CM

## 2023-02-23 LAB
ALBUMIN SERPL BCG-MCNC: 3.9 G/DL (ref 3.5–5.2)
ALP SERPL-CCNC: 68 U/L (ref 35–104)
ALT SERPL W P-5'-P-CCNC: 14 U/L (ref 10–35)
ANION GAP SERPL CALCULATED.3IONS-SCNC: 15 MMOL/L (ref 7–15)
AST SERPL W P-5'-P-CCNC: 26 U/L (ref 10–35)
BASOPHILS # BLD AUTO: 0 10E3/UL (ref 0–0.2)
BASOPHILS NFR BLD AUTO: 0 %
BILIRUB SERPL-MCNC: 0.8 MG/DL
BUN SERPL-MCNC: 37.1 MG/DL (ref 8–23)
CALCIUM SERPL-MCNC: 9.3 MG/DL (ref 8.2–9.6)
CHLORIDE SERPL-SCNC: 105 MMOL/L (ref 98–107)
CREAT SERPL-MCNC: 0.69 MG/DL (ref 0.51–0.95)
DEPRECATED HCO3 PLAS-SCNC: 24 MMOL/L (ref 22–29)
EOSINOPHIL # BLD AUTO: 0 10E3/UL (ref 0–0.7)
EOSINOPHIL NFR BLD AUTO: 0 %
ERYTHROCYTE [DISTWIDTH] IN BLOOD BY AUTOMATED COUNT: 12.2 % (ref 10–15)
FLUAV RNA SPEC QL NAA+PROBE: NEGATIVE
FLUBV RNA RESP QL NAA+PROBE: NEGATIVE
GFR SERPL CREATININE-BSD FRML MDRD: 78 ML/MIN/1.73M2
GLUCOSE SERPL-MCNC: 109 MG/DL (ref 70–99)
HCT VFR BLD AUTO: 37.5 % (ref 35–47)
HGB BLD-MCNC: 12.1 G/DL (ref 11.7–15.7)
IMM GRANULOCYTES # BLD: 0 10E3/UL
IMM GRANULOCYTES NFR BLD: 0 %
INR PPP: 4.32 (ref 0.85–1.15)
LACTATE SERPL-SCNC: 1.4 MMOL/L (ref 0.7–2)
LYMPHOCYTES # BLD AUTO: 0.3 10E3/UL (ref 0.8–5.3)
LYMPHOCYTES NFR BLD AUTO: 6 %
MAGNESIUM SERPL-MCNC: 1.5 MG/DL (ref 1.7–2.3)
MAGNESIUM SERPL-MCNC: 2.3 MG/DL (ref 1.7–2.3)
MCH RBC QN AUTO: 32.1 PG (ref 26.5–33)
MCHC RBC AUTO-ENTMCNC: 32.3 G/DL (ref 31.5–36.5)
MCV RBC AUTO: 100 FL (ref 78–100)
MONOCYTES # BLD AUTO: 0.1 10E3/UL (ref 0–1.3)
MONOCYTES NFR BLD AUTO: 4 %
NEUTROPHILS # BLD AUTO: 3.6 10E3/UL (ref 1.6–8.3)
NEUTROPHILS NFR BLD AUTO: 90 %
NRBC # BLD AUTO: 0 10E3/UL
NRBC BLD AUTO-RTO: 0 /100
PHOSPHATE SERPL-MCNC: 2.5 MG/DL (ref 2.5–4.5)
PLATELET # BLD AUTO: 215 10E3/UL (ref 150–450)
POTASSIUM SERPL-SCNC: 3.7 MMOL/L (ref 3.4–5.3)
PROT SERPL-MCNC: 6.3 G/DL (ref 6.4–8.3)
RBC # BLD AUTO: 3.77 10E6/UL (ref 3.8–5.2)
RSV RNA SPEC NAA+PROBE: NEGATIVE
SARS-COV-2 RNA RESP QL NAA+PROBE: NEGATIVE
SODIUM SERPL-SCNC: 144 MMOL/L (ref 136–145)
WBC # BLD AUTO: 4 10E3/UL (ref 4–11)

## 2023-02-23 PROCEDURE — 87637 SARSCOV2&INF A&B&RSV AMP PRB: CPT | Performed by: EMERGENCY MEDICINE

## 2023-02-23 PROCEDURE — 250N000011 HC RX IP 250 OP 636: Performed by: EMERGENCY MEDICINE

## 2023-02-23 PROCEDURE — 36415 COLL VENOUS BLD VENIPUNCTURE: CPT | Performed by: EMERGENCY MEDICINE

## 2023-02-23 PROCEDURE — 87040 BLOOD CULTURE FOR BACTERIA: CPT | Performed by: EMERGENCY MEDICINE

## 2023-02-23 PROCEDURE — 96368 THER/DIAG CONCURRENT INF: CPT | Performed by: EMERGENCY MEDICINE

## 2023-02-23 PROCEDURE — 84100 ASSAY OF PHOSPHORUS: CPT | Performed by: EMERGENCY MEDICINE

## 2023-02-23 PROCEDURE — 36415 COLL VENOUS BLD VENIPUNCTURE: CPT | Performed by: FAMILY MEDICINE

## 2023-02-23 PROCEDURE — 99285 EMERGENCY DEPT VISIT HI MDM: CPT | Mod: CS,25 | Performed by: EMERGENCY MEDICINE

## 2023-02-23 PROCEDURE — 99222 1ST HOSP IP/OBS MODERATE 55: CPT | Mod: AI | Performed by: NURSE PRACTITIONER

## 2023-02-23 PROCEDURE — C9803 HOPD COVID-19 SPEC COLLECT: HCPCS | Performed by: EMERGENCY MEDICINE

## 2023-02-23 PROCEDURE — 250N000013 HC RX MED GY IP 250 OP 250 PS 637: Performed by: EMERGENCY MEDICINE

## 2023-02-23 PROCEDURE — 83735 ASSAY OF MAGNESIUM: CPT | Performed by: FAMILY MEDICINE

## 2023-02-23 PROCEDURE — 99285 EMERGENCY DEPT VISIT HI MDM: CPT | Mod: CS | Performed by: EMERGENCY MEDICINE

## 2023-02-23 PROCEDURE — 83735 ASSAY OF MAGNESIUM: CPT | Performed by: EMERGENCY MEDICINE

## 2023-02-23 PROCEDURE — 120N000001 HC R&B MED SURG/OB

## 2023-02-23 PROCEDURE — 85025 COMPLETE CBC W/AUTO DIFF WBC: CPT | Performed by: EMERGENCY MEDICINE

## 2023-02-23 PROCEDURE — 250N000013 HC RX MED GY IP 250 OP 250 PS 637: Performed by: NURSE PRACTITIONER

## 2023-02-23 PROCEDURE — 85610 PROTHROMBIN TIME: CPT | Performed by: FAMILY MEDICINE

## 2023-02-23 PROCEDURE — 71045 X-RAY EXAM CHEST 1 VIEW: CPT

## 2023-02-23 PROCEDURE — 80053 COMPREHEN METABOLIC PANEL: CPT | Performed by: EMERGENCY MEDICINE

## 2023-02-23 PROCEDURE — 83605 ASSAY OF LACTIC ACID: CPT | Performed by: EMERGENCY MEDICINE

## 2023-02-23 PROCEDURE — 258N000003 HC RX IP 258 OP 636: Performed by: EMERGENCY MEDICINE

## 2023-02-23 PROCEDURE — 96365 THER/PROPH/DIAG IV INF INIT: CPT | Performed by: EMERGENCY MEDICINE

## 2023-02-23 PROCEDURE — 96361 HYDRATE IV INFUSION ADD-ON: CPT | Performed by: EMERGENCY MEDICINE

## 2023-02-23 PROCEDURE — 96375 TX/PRO/DX INJ NEW DRUG ADDON: CPT | Performed by: EMERGENCY MEDICINE

## 2023-02-23 RX ORDER — NALOXONE HYDROCHLORIDE 0.4 MG/ML
0.4 INJECTION, SOLUTION INTRAMUSCULAR; INTRAVENOUS; SUBCUTANEOUS
Status: DISCONTINUED | OUTPATIENT
Start: 2023-02-23 | End: 2023-02-27 | Stop reason: HOSPADM

## 2023-02-23 RX ORDER — NALOXONE HYDROCHLORIDE 0.4 MG/ML
0.2 INJECTION, SOLUTION INTRAMUSCULAR; INTRAVENOUS; SUBCUTANEOUS
Status: DISCONTINUED | OUTPATIENT
Start: 2023-02-23 | End: 2023-02-27 | Stop reason: HOSPADM

## 2023-02-23 RX ORDER — LIDOCAINE 4 G/G
1 PATCH TOPICAL EVERY 24 HOURS
Status: DISCONTINUED | OUTPATIENT
Start: 2023-02-24 | End: 2023-02-25

## 2023-02-23 RX ORDER — LIDOCAINE 4 G/G
1 PATCH TOPICAL ONCE
Status: COMPLETED | OUTPATIENT
Start: 2023-02-23 | End: 2023-02-24

## 2023-02-23 RX ORDER — ACETAMINOPHEN 325 MG/1
975 TABLET ORAL ONCE
Status: COMPLETED | OUTPATIENT
Start: 2023-02-23 | End: 2023-02-23

## 2023-02-23 RX ORDER — ACETAMINOPHEN 325 MG/1
650 TABLET ORAL EVERY 4 HOURS PRN
Status: DISCONTINUED | OUTPATIENT
Start: 2023-02-23 | End: 2023-02-24

## 2023-02-23 RX ORDER — ONDANSETRON 2 MG/ML
4 INJECTION INTRAMUSCULAR; INTRAVENOUS EVERY 30 MIN PRN
Status: DISCONTINUED | OUTPATIENT
Start: 2023-02-23 | End: 2023-02-23

## 2023-02-23 RX ORDER — CARVEDILOL 6.25 MG/1
6.25 TABLET ORAL 2 TIMES DAILY WITH MEALS
Status: DISCONTINUED | OUTPATIENT
Start: 2023-02-23 | End: 2023-02-27 | Stop reason: HOSPADM

## 2023-02-23 RX ORDER — SODIUM CHLORIDE 9 MG/ML
INJECTION, SOLUTION INTRAVENOUS CONTINUOUS
Status: DISCONTINUED | OUTPATIENT
Start: 2023-02-23 | End: 2023-02-24

## 2023-02-23 RX ORDER — ONDANSETRON 2 MG/ML
4 INJECTION INTRAMUSCULAR; INTRAVENOUS EVERY 6 HOURS PRN
Status: DISCONTINUED | OUTPATIENT
Start: 2023-02-23 | End: 2023-02-27 | Stop reason: HOSPADM

## 2023-02-23 RX ORDER — LISINOPRIL 10 MG/1
20 TABLET ORAL 2 TIMES DAILY
Status: DISCONTINUED | OUTPATIENT
Start: 2023-02-24 | End: 2023-02-27 | Stop reason: HOSPADM

## 2023-02-23 RX ORDER — PANTOPRAZOLE SODIUM 40 MG/1
40 TABLET, DELAYED RELEASE ORAL AT BEDTIME
Status: DISCONTINUED | OUTPATIENT
Start: 2023-02-23 | End: 2023-02-27 | Stop reason: HOSPADM

## 2023-02-23 RX ORDER — MAGNESIUM SULFATE 1 G/100ML
2 INJECTION INTRAVENOUS ONCE
Status: COMPLETED | OUTPATIENT
Start: 2023-02-23 | End: 2023-02-23

## 2023-02-23 RX ORDER — AZITHROMYCIN 500 MG/1
500 INJECTION, POWDER, LYOPHILIZED, FOR SOLUTION INTRAVENOUS EVERY 24 HOURS
Status: DISCONTINUED | OUTPATIENT
Start: 2023-02-23 | End: 2023-02-25

## 2023-02-23 RX ORDER — ALBUTEROL SULFATE 90 UG/1
2 AEROSOL, METERED RESPIRATORY (INHALATION) 2 TIMES DAILY
Status: DISCONTINUED | OUTPATIENT
Start: 2023-02-23 | End: 2023-02-27 | Stop reason: HOSPADM

## 2023-02-23 RX ORDER — LACTULOSE 10 G/15ML
30 SOLUTION ORAL DAILY PRN
Status: DISCONTINUED | OUTPATIENT
Start: 2023-02-23 | End: 2023-02-27 | Stop reason: HOSPADM

## 2023-02-23 RX ORDER — CEFTRIAXONE 2 G/1
2 INJECTION, POWDER, FOR SOLUTION INTRAMUSCULAR; INTRAVENOUS EVERY 24 HOURS
Status: DISCONTINUED | OUTPATIENT
Start: 2023-02-23 | End: 2023-02-26

## 2023-02-23 RX ORDER — ONDANSETRON 4 MG/1
4 TABLET, ORALLY DISINTEGRATING ORAL EVERY 6 HOURS PRN
Status: DISCONTINUED | OUTPATIENT
Start: 2023-02-23 | End: 2023-02-27 | Stop reason: HOSPADM

## 2023-02-23 RX ORDER — ONDANSETRON 2 MG/ML
4 INJECTION INTRAMUSCULAR; INTRAVENOUS EVERY 6 HOURS PRN
Status: DISCONTINUED | OUTPATIENT
Start: 2023-02-23 | End: 2023-02-23

## 2023-02-23 RX ORDER — ACETAMINOPHEN 325 MG/1
975 TABLET ORAL EVERY 8 HOURS
Status: DISCONTINUED | OUTPATIENT
Start: 2023-02-23 | End: 2023-02-25

## 2023-02-23 RX ORDER — ONDANSETRON 4 MG/1
4 TABLET, ORALLY DISINTEGRATING ORAL EVERY 6 HOURS PRN
Status: DISCONTINUED | OUTPATIENT
Start: 2023-02-23 | End: 2023-02-23

## 2023-02-23 RX ORDER — LIDOCAINE 40 MG/G
CREAM TOPICAL
Status: DISCONTINUED | OUTPATIENT
Start: 2023-02-23 | End: 2023-02-27 | Stop reason: HOSPADM

## 2023-02-23 RX ADMIN — CEFTRIAXONE SODIUM 2 G: 2 INJECTION, POWDER, FOR SOLUTION INTRAMUSCULAR; INTRAVENOUS at 16:19

## 2023-02-23 RX ADMIN — SODIUM CHLORIDE 500 ML: 9 INJECTION, SOLUTION INTRAVENOUS at 16:19

## 2023-02-23 RX ADMIN — PANTOPRAZOLE SODIUM 40 MG: 40 TABLET, DELAYED RELEASE ORAL at 20:21

## 2023-02-23 RX ADMIN — SODIUM CHLORIDE: 9 INJECTION, SOLUTION INTRAVENOUS at 14:34

## 2023-02-23 RX ADMIN — ONDANSETRON 4 MG: 2 INJECTION INTRAMUSCULAR; INTRAVENOUS at 14:33

## 2023-02-23 RX ADMIN — LIDOCAINE 1 PATCH: 560 PATCH PERCUTANEOUS; TOPICAL; TRANSDERMAL at 16:19

## 2023-02-23 RX ADMIN — ACETAMINOPHEN 975 MG: 325 TABLET ORAL at 19:55

## 2023-02-23 RX ADMIN — AZITHROMYCIN 500 MG: 500 INJECTION, POWDER, LYOPHILIZED, FOR SOLUTION INTRAVENOUS at 16:57

## 2023-02-23 RX ADMIN — MAGNESIUM SULFATE HEPTAHYDRATE 2 G: 1 INJECTION, SOLUTION INTRAVENOUS at 17:04

## 2023-02-23 RX ADMIN — CARVEDILOL 6.25 MG: 6.25 TABLET, FILM COATED ORAL at 19:54

## 2023-02-23 RX ADMIN — SODIUM CHLORIDE: 9 INJECTION, SOLUTION INTRAVENOUS at 19:54

## 2023-02-23 ASSESSMENT — ACTIVITIES OF DAILY LIVING (ADL)
CHANGE_IN_FUNCTIONAL_STATUS_SINCE_ONSET_OF_CURRENT_ILLNESS/INJURY: YES
TRANSFERRING: 0-->ASSISTANCE NEEDED (DEVELOPMENTALLY APPROPRIATE)
ADLS_ACUITY_SCORE: 35
BATHING: 1-->ASSISTANCE NEEDED
DIFFICULTY_EATING/SWALLOWING: NO
WALKING_OR_CLIMBING_STAIRS: AMBULATION DIFFICULTY, REQUIRES EQUIPMENT
WALKING_OR_CLIMBING_STAIRS_DIFFICULTY: YES
VISION_MANAGEMENT: GLASSES
FALL_HISTORY_WITHIN_LAST_SIX_MONTHS: NO
DRESSING/BATHING_MANAGEMENT: GRAB BARS
DRESSING/BATHING_DIFFICULTY: YES
TRANSFERRING: 1-->ASSISTANCE (EQUIPMENT/PERSON) NEEDED
TOILETING_ISSUES: NO
DRESS: 1-->ASSISTANCE (EQUIPMENT/PERSON) NEEDED
ADLS_ACUITY_SCORE: 35
ADLS_ACUITY_SCORE: 39
DRESSING/BATHING: BATHING DIFFICULTY, REQUIRES EQUIPMENT
ADLS_ACUITY_SCORE: 35
DOING_ERRANDS_INDEPENDENTLY_DIFFICULTY: YES
ADLS_ACUITY_SCORE: 39
DRESS: 0-->ASSISTANCE NEEDED (DEVELOPMENTALLY APPROPRIATE)
WEAR_GLASSES_OR_BLIND: YES
CONCENTRATING,_REMEMBERING_OR_MAKING_DECISIONS_DIFFICULTY: NO

## 2023-02-23 NOTE — ED PROVIDER NOTES
"  History     Chief Complaint   Patient presents with     Nausea, Vomiting, & Diarrhea     HPI  History per patient, medical records    This is a 89-year-old female, history of atrial fibrillation on Coumadin, sick sinus syndrome status post pacemaker placement, hypertension, hyperlipidemia, presenting with nausea, vomiting, diarrhea.  Patient recently moved to an assisted living facility and states that overnight she had episode of nausea and vomiting.  She has been weak.  She has been having diarrhea with some incontinence of stool but she also notes that she took some lactulose and senna and ate prunes.  She complains of pain in her right leg, left ankle, bilateral shoulders which she states is from having to push her wheelchair around after a recent ankle fracture.  She was hospitalized in this facility 2/18 through 2/20/2023 for right knee pain negative x-ray.  She is supposed to managed with tramadol and lidocaine patch.  She states that nobody has placed a lidocaine patch since she arrived at the assisted living facility.  She was brought in by medics and was noted to have O2 sats of 89% on room air, O2 was placed per nasal cannula.  She has been coughing.  She denies chest pain.  She has mild nausea currently.  Patient noted to be febrile on arrival, she states her baseline temperature is 96.  She does not know of any obvious sick contacts.    Allergies:  Allergies   Allergen Reactions     Cefuroxime Diarrhea     Hydrochlorothiazide Other (See Comments)     hyponatremia     Nitrofurantoin Other (See Comments)     Dizzy, headache, \"sick all over\"     Oxybutynin Other (See Comments)     Dizziness ,lightheaded, urine retention on 10 mg dose     Sulfa Drugs      Amoxicillin Other (See Comments)     Diarrhea       Problem List:    Patient Active Problem List    Diagnosis Date Noted     Hypoxemia 02/23/2023     Priority: Medium     SIRS (systemic inflammatory response syndrome) (H) 02/23/2023     Priority: Medium " "    Community acquired pneumonia, unspecified laterality 02/23/2023     Priority: Medium     Current use of long term anticoagulation 02/20/2023     Priority: Medium     Right knee pain 02/18/2023     Priority: Medium        Past Medical History:    Past Medical History:   Diagnosis Date     Anxiety      Chronic anticoagulation      DJD (degenerative joint disease) of knee      HTN (hypertension)      Mitral insufficiency      Osteopenia      PAF (paroxysmal atrial fibrillation) (H)      Raynaud's syndrome      Recurrent UTI      SBO (small bowel obstruction) (H)      SSS (sick sinus syndrome) (H)      Urinary incontinence        Past Surgical History:    Past Surgical History:   Procedure Laterality Date     APPENDECTOMY       AS LAP,LYSIS OF ADHESIONS       CHOLECYSTECTOMY       HYSTERECTOMY       IMPLANT PACEMAKER       STAPEDECTOMY       TONSILLECTOMY         Family History:    Family History   Problem Relation Age of Onset     Colon Cancer Mother      Cerebrovascular Disease Father      Kidney Disease Sister      Breast Cancer Maternal Grandmother        Social History:  Marital Status:   [2]  Social History     Tobacco Use     Smoking status: Never        Medications:    No current outpatient medications on file.        Review of Systems   All other ROS reviewed and are negative or non-contributory except as stated in HPI.     Physical Exam   BP: (!) 172/75  Pulse: 77  Temp: (!) 101.4  F (38.6  C)  Resp: 20  Height: 157.5 cm (5' 2\")  Weight: 58.2 kg (128 lb 4.9 oz)  SpO2: (!) 89 %      Physical Exam  Vitals and nursing note reviewed.   Constitutional:       Comments: Very pleasant elderly female lying in the bed.  Frail-appearing.  Extremely dry mucous membranes.   HENT:      Head: Normocephalic.      Ears:      Comments: Hearing aid left ear     Nose: Nose normal.      Mouth/Throat:      Mouth: Mucous membranes are dry.   Eyes:      Extraocular Movements: Extraocular movements intact.      " Conjunctiva/sclera: Conjunctivae normal.   Cardiovascular:      Rate and Rhythm: Normal rate and regular rhythm.      Pulses: Normal pulses.      Comments: Loud murmur, pacemaker in place  Pulmonary:      Comments: Decreased breath sounds at bases  Abdominal:      Palpations: Abdomen is soft.      Tenderness: There is no abdominal tenderness.   Musculoskeletal:      Cervical back: Normal range of motion and neck supple.      Comments: Right knee with effusion.  No erythema.  Tenderness to palpation.   Skin:     General: Skin is warm and dry.   Neurological:      General: No focal deficit present.      Mental Status: She is alert.   Psychiatric:         Mood and Affect: Mood normal.         Behavior: Behavior normal.         ED Course (with Medical Decision Making)    Pt seen and examined by me.  RN and EPIC notes reviewed.       Patient with initial concerns nausea and vomiting, presents with fever, decreased oxygen saturations.  She has been having a cough.  Concern for or COVID, pneumonia she appears very dry so most concerned for dehydration.  On IV, fluids, labs, chest x-ray, COVID swab, nausea medications    Viral swab is negative.  Patient with elevated BUN/creatinine, IV fluids running.  Lactate is normal.  Magnesium slightly low.  Magnesium replacement ordered.  Normal white count.  Chest x-ray shows patchy nodular opacities in the right lung and in the left lower lobe.    IV antibiotics started for community-acquired pneumonia, Zithromax and ceftriaxone.  Results discussed with the patient.  I think with her age, low oxygen saturations, high fever, I think she merits admission for further management.  I suspect she is not tachycardic due to her beta-blocker.  She appears very dry.  She is receiving IV fluids and antibiotics as noted.  I spoke with the hospitalist and patient admitted in stable condition.   Procedures    Results for orders placed or performed during the hospital encounter of 02/23/23 (from  the past 24 hour(s))   Symptomatic Influenza A/B & SARS-CoV2 (COVID-19) Virus PCR Multiplex Nose    Specimen: Nose; Swab   Result Value Ref Range    Influenza A PCR Negative Negative    Influenza B PCR Negative Negative    RSV PCR Negative Negative    SARS CoV2 PCR Negative Negative    Narrative    Testing was performed using the Xpert Xpress CoV2/Flu/RSV Assay on the [employer] GeneXpert Instrument. This test should be ordered for the detection of SARS-CoV-2 and influenza viruses in individuals who meet clinical and/or epidemiological criteria. Test performance is unknown in asymptomatic patients. This test is for in vitro diagnostic use under the FDA EUA for laboratories certified under CLIA to perform high or moderate complexity testing. This test has not been FDA cleared or approved. A negative result does not rule out the presence of PCR inhibitors in the specimen or target RNA in concentration below the limit of detection for the assay. If only one viral target is positive but coinfection with multiple targets is suspected, the sample should be re-tested with another FDA cleared, approved, or authorized test, if coinfection would change clinical management. This test was validated by the [facility]. These laboratories are certified under the Clinical Laboratory Improvement Amendments of 1988 (CLIA-88) as qualified to perform high complexity laboratory testing.   CBC with platelets differential    Narrative    The following orders were created for panel order CBC with platelets differential.  Procedure                               Abnormality         Status                     ---------                               -----------         ------                     CBC with platelets and d...[[id number]]  Abnormal            Final result                 Please view results for these tests on the individual orders.   Comprehensive metabolic panel   Result Value Ref Range    Sodium 144 136 - 145  mmol/L    Potassium 3.7 3.4 - 5.3 mmol/L    Chloride 105 98 - 107 mmol/L    Carbon Dioxide (CO2) 24 22 - 29 mmol/L    Anion Gap 15 7 - 15 mmol/L    Urea Nitrogen 37.1 (H) 8.0 - 23.0 mg/dL    Creatinine 0.69 0.51 - 0.95 mg/dL    Calcium 9.3 8.2 - 9.6 mg/dL    Glucose 109 (H) 70 - 99 mg/dL    Alkaline Phosphatase 68 35 - 104 U/L    AST 26 10 - 35 U/L    ALT 14 10 - 35 U/L    Protein Total 6.3 (L) 6.4 - 8.3 g/dL    Albumin 3.9 3.5 - 5.2 g/dL    Bilirubin Total 0.8 <=1.2 mg/dL    GFR Estimate 78 >60 mL/min/1.73m2   Lactic acid whole blood   Result Value Ref Range    Lactic Acid 1.4 0.7 - 2.0 mmol/L   Magnesium   Result Value Ref Range    Magnesium 1.5 (L) 1.7 - 2.3 mg/dL   Phosphorus   Result Value Ref Range    Phosphorus 2.5 2.5 - 4.5 mg/dL   CBC with platelets and differential   Result Value Ref Range    WBC Count 4.0 4.0 - 11.0 10e3/uL    RBC Count 3.77 (L) 3.80 - 5.20 10e6/uL    Hemoglobin 12.1 11.7 - 15.7 g/dL    Hematocrit 37.5 35.0 - 47.0 %     78 - 100 fL    MCH 32.1 26.5 - 33.0 pg    MCHC 32.3 31.5 - 36.5 g/dL    RDW 12.2 10.0 - 15.0 %    Platelet Count 215 150 - 450 10e3/uL    % Neutrophils 90 %    % Lymphocytes 6 %    % Monocytes 4 %    % Eosinophils 0 %    % Basophils 0 %    % Immature Granulocytes 0 %    NRBCs per 100 WBC 0 <1 /100    Absolute Neutrophils 3.6 1.6 - 8.3 10e3/uL    Absolute Lymphocytes 0.3 (L) 0.8 - 5.3 10e3/uL    Absolute Monocytes 0.1 0.0 - 1.3 10e3/uL    Absolute Eosinophils 0.0 0.0 - 0.7 10e3/uL    Absolute Basophils 0.0 0.0 - 0.2 10e3/uL    Absolute Immature Granulocytes 0.0 <=0.4 10e3/uL    Absolute NRBCs 0.0 10e3/uL   XR Chest Port 1 View    Narrative    XR CHEST PORT 1 VIEW   2/23/2023 3:11 PM     HISTORY: fever; shortness of breath    COMPARISON: None.      Impression    IMPRESSION: Cardiac silhouette at the upper limits of normal in size.  Pacemaker leads overlying the right atrium and right ventricle. Patchy  nodular opacities throughout the right lung and in the left  lower  lobe, favor pneumonia but recommend radiographic follow-up to  resolution to exclude underlying lesions. No pleural effusion or  pneumothorax. No acute bony abnormality. 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    JAKE COLON MD         SYSTEM ID:  [id number]   INR   Result Value Ref Range    INR 4.32 (H) 0.85 - 1.15   Magnesium   Result Value Ref Range    Magnesium 2.3 1.7 - 2.3 mg/dL       Medications   sodium chloride 0.9% infusion (0 mLs Intravenous Stopped 2/23/23 1735)   Lidocaine (LIDOCARE) 4 % Patch 1 patch (1 patch Transdermal $Patch/Med Applied 2/23/23 1619)   azithromycin (ZITHROMAX) 500 mg vial to attach to  mL bag (500 mg Intravenous $New Bag 2/23/23 1657)   cefTRIAXone (ROCEPHIN) 2 g vial to attach to  ml bag for ADULTS or NS 50 ml bag for PEDS (0 g Intravenous Stopped 2/23/23 1835)   sodium chloride 0.9% infusion ( Intravenous $New Bag 2/23/23 1954)   acetaminophen (TYLENOL) tablet 650 mg (has no administration in time range)   albuterol (PROVENTIL HFA/VENTOLIN HFA) inhaler (2 puffs Inhalation Not Given 2/23/23 2242)   carvedilol (COREG) tablet 6.25 mg (6.25 mg Oral $Given 2/23/23 1954)   lactulose (CHRONULAC) solution 30 mL (has no administration in time range)   Lidocaine (LIDOCARE) 4 % Patch 1 patch (has no administration in time range)     And   lidocaine patch in PLACE ( Transdermal Patch in Place 2/23/23 2144)   lisinopril (ZESTRIL) tablet 20 mg (has no administration in time range)   pantoprazole (PROTONIX) EC tablet 40 mg (40 mg Oral $Given 2/23/23 2021)   lidocaine 1 % 0.1-1 mL (has no administration in time range)   lidocaine (LMX4) kit (has no administration in time range)   sodium chloride (PF) 0.9% PF flush 3 mL (3 mLs Intracatheter $Given 2/23/23 1953)   sodium chloride (PF) 0.9% PF flush 3 mL (has no administration in time range)   melatonin tablet 1 mg (has no administration in time range)   Patient is already receiving anticoagulation with heparin, enoxaparin  (LOVENOX), warfarin (COUMADIN)  or other anticoagulant medication (has no administration in time range)   acetaminophen (TYLENOL) tablet 975 mg (975 mg Oral $Given 2/23/23 1955)   ondansetron (ZOFRAN ODT) ODT tab 4 mg (has no administration in time range)     Or   ondansetron (ZOFRAN) injection 4 mg (has no administration in time range)   oxyCODONE IR (ROXICODONE) half-tab 2.5 mg (has no administration in time range)   Warfarin Dose Required Daily - Pharmacist Managed (has no administration in time range)   naloxone (NARCAN) injection 0.2 mg (has no administration in time range)     Or   naloxone (NARCAN) injection 0.4 mg (has no administration in time range)     Or   naloxone (NARCAN) injection 0.2 mg (has no administration in time range)     Or   naloxone (NARCAN) injection 0.4 mg (has no administration in time range)   warfarin-No DOSE today (has no administration in time range)   acetaminophen (TYLENOL) tablet 975 mg (0 mg Oral Return to Cabinet 2/23/23 1439)   0.9% sodium chloride BOLUS (0 mLs Intravenous Stopped 2/23/23 1834)   magnesium sulfate 2 g in 100 mL D5W intermittent infusion (0 g Intravenous Stopped 2/23/23 1840)       Assessments & Plan      I have reviewed the findings, diagnosis, plan and need for follow up with the patient.  Current Discharge Medication List          Final diagnoses:   SIRS (systemic inflammatory response syndrome) (H)   Community acquired pneumonia, unspecified laterality   Hypoxemia     \Disposition: Patient admitted to the hospitalist service    Note: Chart documentation done in part with Dragon Voice Recognition software. Although reviewed after completion, some word and grammatical errors may remain.     2/23/2023   [facility] EMERGENCY DEPT     Tete Herrera MD  02/24/23 0055

## 2023-02-23 NOTE — ED TRIAGE NOTES
Pt arrived via ambulance for vomiting and diarrhea. Pt states vomiting during the night and hasn't been able to hold her stool. Pt was incontinent of loose stool when she arrived.      Triage Assessment     Row Name 02/23/23 7892       Triage Assessment (Adult)    Airway WDL WDL       Respiratory WDL    Respiratory WDL WDL       Skin Circulation/Temperature WDL    Skin Circulation/Temperature WDL temperature    Skin Temperature warm       Cardiac WDL    Cardiac WDL WDL       Peripheral/Neurovascular WDL    Peripheral Neurovascular WDL WDL       Cognitive/Neuro/Behavioral WDL    Cognitive/Neuro/Behavioral WDL WDL

## 2023-02-24 ENCOUNTER — APPOINTMENT (OUTPATIENT)
Dept: PHYSICAL THERAPY | Facility: CLINIC | Age: 88
DRG: 193 | End: 2023-02-24
Attending: NURSE PRACTITIONER
Payer: MEDICARE

## 2023-02-24 ENCOUNTER — APPOINTMENT (OUTPATIENT)
Dept: GENERAL RADIOLOGY | Facility: CLINIC | Age: 88
DRG: 193 | End: 2023-02-24
Attending: NURSE PRACTITIONER
Payer: MEDICARE

## 2023-02-24 LAB
ANION GAP SERPL CALCULATED.3IONS-SCNC: 10 MMOL/L (ref 7–15)
BUN SERPL-MCNC: 35 MG/DL (ref 8–23)
CALCIUM SERPL-MCNC: 8.4 MG/DL (ref 8.2–9.6)
CHLORIDE SERPL-SCNC: 106 MMOL/L (ref 98–107)
CREAT SERPL-MCNC: 0.82 MG/DL (ref 0.51–0.95)
DEPRECATED HCO3 PLAS-SCNC: 24 MMOL/L (ref 22–29)
ERYTHROCYTE [DISTWIDTH] IN BLOOD BY AUTOMATED COUNT: 12.4 % (ref 10–15)
GFR SERPL CREATININE-BSD FRML MDRD: 64 ML/MIN/1.73M2
GLUCOSE SERPL-MCNC: 82 MG/DL (ref 70–99)
HCT VFR BLD AUTO: 29.7 % (ref 35–47)
HCT VFR BLD AUTO: 30.9 % (ref 35–47)
HGB BLD-MCNC: 9.2 G/DL (ref 11.7–15.7)
HGB BLD-MCNC: 9.5 G/DL (ref 11.7–15.7)
INR PPP: 4.68 (ref 0.85–1.15)
MAGNESIUM SERPL-MCNC: 2.1 MG/DL (ref 1.7–2.3)
MCH RBC QN AUTO: 31.8 PG (ref 26.5–33)
MCHC RBC AUTO-ENTMCNC: 31 G/DL (ref 31.5–36.5)
MCV RBC AUTO: 103 FL (ref 78–100)
PLATELET # BLD AUTO: 151 10E3/UL (ref 150–450)
POTASSIUM SERPL-SCNC: 3.1 MMOL/L (ref 3.4–5.3)
POTASSIUM SERPL-SCNC: 3.4 MMOL/L (ref 3.4–5.3)
RBC # BLD AUTO: 2.89 10E6/UL (ref 3.8–5.2)
SODIUM SERPL-SCNC: 140 MMOL/L (ref 136–145)
WBC # BLD AUTO: 7.6 10E3/UL (ref 4–11)

## 2023-02-24 PROCEDURE — 72170 X-RAY EXAM OF PELVIS: CPT

## 2023-02-24 PROCEDURE — 258N000003 HC RX IP 258 OP 636: Performed by: EMERGENCY MEDICINE

## 2023-02-24 PROCEDURE — 85027 COMPLETE CBC AUTOMATED: CPT | Performed by: NURSE PRACTITIONER

## 2023-02-24 PROCEDURE — 999N000123 HC STATISTIC OXYGEN O2DAILY TECH TIME

## 2023-02-24 PROCEDURE — 120N000001 HC R&B MED SURG/OB

## 2023-02-24 PROCEDURE — 250N000011 HC RX IP 250 OP 636: Performed by: EMERGENCY MEDICINE

## 2023-02-24 PROCEDURE — 84132 ASSAY OF SERUM POTASSIUM: CPT | Performed by: FAMILY MEDICINE

## 2023-02-24 PROCEDURE — 80048 BASIC METABOLIC PNL TOTAL CA: CPT | Performed by: NURSE PRACTITIONER

## 2023-02-24 PROCEDURE — 250N000013 HC RX MED GY IP 250 OP 250 PS 637: Performed by: EMERGENCY MEDICINE

## 2023-02-24 PROCEDURE — 36415 COLL VENOUS BLD VENIPUNCTURE: CPT | Performed by: NURSE PRACTITIONER

## 2023-02-24 PROCEDURE — 99232 SBSQ HOSP IP/OBS MODERATE 35: CPT | Performed by: NURSE PRACTITIONER

## 2023-02-24 PROCEDURE — 85014 HEMATOCRIT: CPT | Performed by: NURSE PRACTITIONER

## 2023-02-24 PROCEDURE — 97161 PT EVAL LOW COMPLEX 20 MIN: CPT | Mod: GP | Performed by: PHYSICAL THERAPIST

## 2023-02-24 PROCEDURE — 36415 COLL VENOUS BLD VENIPUNCTURE: CPT | Performed by: FAMILY MEDICINE

## 2023-02-24 PROCEDURE — 250N000013 HC RX MED GY IP 250 OP 250 PS 637: Performed by: FAMILY MEDICINE

## 2023-02-24 PROCEDURE — 83735 ASSAY OF MAGNESIUM: CPT | Performed by: FAMILY MEDICINE

## 2023-02-24 PROCEDURE — 250N000013 HC RX MED GY IP 250 OP 250 PS 637: Performed by: NURSE PRACTITIONER

## 2023-02-24 PROCEDURE — 85610 PROTHROMBIN TIME: CPT | Performed by: NURSE PRACTITIONER

## 2023-02-24 RX ORDER — LIDOCAINE 4 G/G
1 PATCH TOPICAL
Status: DISCONTINUED | OUTPATIENT
Start: 2023-02-24 | End: 2023-02-25

## 2023-02-24 RX ORDER — POTASSIUM CHLORIDE 1500 MG/1
40 TABLET, EXTENDED RELEASE ORAL ONCE
Status: COMPLETED | OUTPATIENT
Start: 2023-02-24 | End: 2023-02-24

## 2023-02-24 RX ADMIN — LISINOPRIL 20 MG: 10 TABLET ORAL at 08:57

## 2023-02-24 RX ADMIN — ACETAMINOPHEN 975 MG: 325 TABLET ORAL at 12:34

## 2023-02-24 RX ADMIN — LIDOCAINE 1 PATCH: 560 PATCH PERCUTANEOUS; TOPICAL; TRANSDERMAL at 19:10

## 2023-02-24 RX ADMIN — PANTOPRAZOLE SODIUM 40 MG: 40 TABLET, DELAYED RELEASE ORAL at 20:24

## 2023-02-24 RX ADMIN — LIDOCAINE 1 PATCH: 560 PATCH PERCUTANEOUS; TOPICAL; TRANSDERMAL at 17:21

## 2023-02-24 RX ADMIN — ACETAMINOPHEN 650 MG: 325 TABLET ORAL at 08:57

## 2023-02-24 RX ADMIN — POTASSIUM CHLORIDE 40 MEQ: 1500 TABLET, EXTENDED RELEASE ORAL at 20:24

## 2023-02-24 RX ADMIN — ACETAMINOPHEN 975 MG: 325 TABLET ORAL at 06:15

## 2023-02-24 RX ADMIN — CEFTRIAXONE SODIUM 2 G: 2 INJECTION, POWDER, FOR SOLUTION INTRAMUSCULAR; INTRAVENOUS at 15:50

## 2023-02-24 RX ADMIN — AZITHROMYCIN 500 MG: 500 INJECTION, POWDER, LYOPHILIZED, FOR SOLUTION INTRAVENOUS at 17:21

## 2023-02-24 RX ADMIN — ALBUTEROL SULFATE 2 PUFF: 90 AEROSOL, METERED RESPIRATORY (INHALATION) at 08:59

## 2023-02-24 RX ADMIN — CARVEDILOL 6.25 MG: 6.25 TABLET, FILM COATED ORAL at 19:10

## 2023-02-24 RX ADMIN — CARVEDILOL 6.25 MG: 6.25 TABLET, FILM COATED ORAL at 08:58

## 2023-02-24 RX ADMIN — ACETAMINOPHEN 975 MG: 325 TABLET ORAL at 21:34

## 2023-02-24 RX ADMIN — ALBUTEROL SULFATE 2 PUFF: 90 AEROSOL, METERED RESPIRATORY (INHALATION) at 20:24

## 2023-02-24 RX ADMIN — LISINOPRIL 20 MG: 10 TABLET ORAL at 20:24

## 2023-02-24 ASSESSMENT — ACTIVITIES OF DAILY LIVING (ADL)
ADLS_ACUITY_SCORE: 41
ADLS_ACUITY_SCORE: 39
ADLS_ACUITY_SCORE: 41
ADLS_ACUITY_SCORE: 39
ADLS_ACUITY_SCORE: 41
ADLS_ACUITY_SCORE: 39

## 2023-02-24 NOTE — PROGRESS NOTES
[facility]    Medicine Progress Note - Hospitalist Service    Date of Admission:  2/23/2023    Assessment & Plan   # Acute hypoxic respiratory failure. O2 sat 89% on RA. Secondary to bilateral pneumonia  Currently off O2 Sats 90-91%   Rocephin and Zithromax      # Acute bilateral CAP-POA  Rocephin 1 gm IV q24h and Zithromax 500 mg IV q24  Supplemental O2 titrate/wean ASAP  Nebs PRN  BC x2 at 12h  Stop IVF      # SIRS POA  Fever 101.4 In ED   Hypoxia 89% RA  Tachypnea 20/min  She was not tachycardic, however, she takes beta blocker likely would not develop tachycardia  T-Max 99.0          # Intermittent atrial fib.-chronic  Rate controlled  Continue rate controlling meds     # Chronic anticoagulation  Pharmacy managing  INR 2-3 for a-fib   INR 4.32 Coumadin on hold       # HTN:   Continue antihypertensives     # Hypomagnesemia  Nurse driven replacement protocol      # DJD: Polyarticular knees, feet, elbows, hands  Conservative management, scheduled tylenol and Lidocaine patch  C/O Right hip pain. No reports of fall. X-ray hip/pelvis      # Frail Elderly:   Lives in [place] with spouse that has medical issues  DJD and osteopenia contribute to fraility.  PT/OT evaluations  Anticipate return to her [place]     # [place]:   Interferes with communication     # Anemia: Normocytic/normochromic    Had 3 gm drop in Hgb from admission. 12.1> 9.2   Last 3 visits to ED she has had mild anemia   She is not symptomatic  Check stools for occult blood   Monitor. Transfuse if hgb < 7.0 and or symptomatic         Diet: Combination Diet Regular Diet Adult    DVT Prophylaxis: Warfarin  Godinez Catheter: Not present  Lines: None     Cardiac Monitoring: ACTIVE order. Indication: SIRS  Code Status: Full Code      Clinically Significant Risk Factors Present on Admission          # Hypocalcemia: Lowest Ca = 8.4 mg/dL in last 2 days, will monitor and replace as appropriate   # Hypomagnesemia: Lowest Mg = 1.5 mg/dL in last 2  days, will replace as needed    # Drug Induced Coagulation Defect: home medication list includes an anticoagulant medication    # Hypertension: home medication list includes antihypertensive(s)              Disposition Plan      Expected Discharge Date: 02/25/2023      Destination: assisted living          The patient's care was discussed with the Attending Physician, Dr. Jauregui .    JERSON Goff [facility]  Hospitalist Service  [facility]  Securely message with [employer] (more info)  Text page via [employer] Paging/Directory   ______________________________________________________________________    Interval History   Reviewed overnight notes. No significant events     Physical Exam   Vital Signs: Temp: 99.8  F (37.7  C) Temp src: Axillary BP: (!) 140/61 Pulse: 64   Resp: 20 SpO2: 91 % O2 Device: Nasal cannula with humidification Oxygen Delivery: 1 LPM  Weight: 128 lbs 4.92 oz    Constitutional: 89 yo female in bed. She is currently on RA and is not in any distress  Eyes: sclera clear and conjunctiva normal  Hematologic / Lymphatic: No active bleeding or bruising   Respiratory: BS diminished bilaterally CTA, no wheezing, rales or ronchi  Cardiovascular: Normal apical impulse, regular rate and rhythm, normal S1 and S2, no S3 or S4, and no murmur noted  GI: normal bowel sounds, soft, non-distended and non-tender  Skin: no bruising or bleeding, normal skin color, texture, turgor, no pallor   Musculoskeletal: Palpation of the right hip-non-tender  Neurologic: Alert and oriented. Conversation is appropriate. She is very [place]     Medical Decision Making       40 MINUTES SPENT BY ME on the date of service doing chart review, history, exam, documentation & further activities per the note.      Data     I have personally reviewed the following data over the past 24 hrs:    7.6  \   9.2 (L)   / 151     140 106 35.0 (H) /  82   3.4 24 0.82 \       ALT: 14 AST: 26 AP: 68 TBILI: 0.8   ALB: 3.9 TOT  PROTEIN: 6.3 (L) LIPASE: N/A       Procal: N/A CRP: N/A Lactic Acid: 1.4       INR:  4.68 (H) PTT:  N/A   D-dimer:  N/A Fibrinogen:  N/A       Imaging results reviewed over the past 24 hrs:   Recent Results (from the past 24 hour(s))   XR Chest Port 1 View    Narrative    XR CHEST PORT 1 VIEW   2/23/2023 3:11 PM     HISTORY: fever; shortness of breath    COMPARISON: None.      Impression    IMPRESSION: Cardiac silhouette at the upper limits of normal in size.  Pacemaker leads overlying the right atrium and right ventricle. Patchy  nodular opacities throughout the right lung and in the left lower  lobe, favor pneumonia but recommend radiographic follow-up to  resolution to exclude underlying lesions. No pleural effusion or  pneumothorax. No acute bony abnormality. 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    JAKE COLON MD         SYSTEM ID:  [id number]

## 2023-02-24 NOTE — PLAN OF CARE
Goal Outcome Evaluation:      Plan of Care Reviewed With: patient    Overall Patient Progress: improvingOverall Patient Progress: improving    Outcome Evaluation: Patient is a very pleasant lady, alert and oriented x4. Speech is a little garbled due to missing teeth, otherwise spontaneous and logical. VS stable with 1L O2 NC. Fine crackles at bilateral lower lung lobes, bowel sounds normoactive. Noted with pacemaker. She is very [place] and has 1 HA on left ear. No notable skin issues. Reports 5-6/10 pain on both ankles, right knee and leg. Pain improved with scheduled Tylenol, repositioning and warm blankets. Patient took her pills whole with water. No complaints of nausea. She is incontinent of urine. No BM this shift.

## 2023-02-24 NOTE — PLAN OF CARE
Occupational Therapy: Orders received. Chart reviewed and discussed with care team.? Occupational Therapy not indicated due to from half-way, assist for cares provided and no cognition concerns.? Defer discharge recommendations to PT.? Will complete orders.     Thank you for your referral.  Danya Martin, PT, DPT, ATC, [facility]  O: [phone]  E: [email or web address]

## 2023-02-24 NOTE — PROGRESS NOTES
Confirmed with martin Mullen that patient only has 1 HA on the left which is present in the room. Writer has requested that Paz bring in dentures.

## 2023-02-24 NOTE — PHARMACY-ANTICOAGULATION SERVICE
Clinical Pharmacy - Warfarin Dosing Consult     Pharmacy has been consulted to manage this patient s warfarin therapy.  Indication: Atrial Fibrillation  Therapy Goal: INR 2-3  OP Anticoag Clinic: [facility] vs [facility] (patient's skilled nursing)  Warfarin Prior to Admission: Yes  Warfarin PTA Regimen: 2.5 mg Monday and Friday, and 3.75 mg all other days  Significant drug interactions: Azithromycin  Recent documented change in oral intake/nutrition: Unknown    INR   Date Value Ref Range Status   02/24/2023 4.68 (H) 0.85 - 1.15 Final   02/23/2023 4.32 (H) 0.85 - 1.15 Final     Warfarin held yesterday due to elevated INR. INR still trending upward    Recommend to HOLD warfarin again today.  Pharmacy will monitor Radha Hennessy daily and order warfarin doses to achieve specified goal.      Please contact pharmacy as soon as possible if the warfarin needs to be held for a procedure or if the warfarin goals change.

## 2023-02-24 NOTE — PROGRESS NOTES
02/24/23 0945   Appointment Info   Signing Clinician's Name / Credentials (PT) Danya Martin PT, DPT, ATC, LAT       Present no   Living Environment   People in Home spouse;facility resident   Current Living Arrangements assisted living   Home Accessibility no concerns   Transportation Anticipated family or friend will provide   Self-Care   Usual Activity Tolerance fair   Current Activity Tolerance poor   Regular Exercise No   Equipment Currently Used at Home grab bar, toilet;raised toilet seat;wheelchair, manual   Fall history within last six months no   General Information   Onset of Illness/Injury or Date of Surgery 02/23/23   Referring Physician Porter Ardon, APRN, CNP   Patient/Family Therapy Goals Statement (PT) fix right hip pain   Pertinent History of Current Problem (include personal factors and/or comorbidities that impact the POC) Patient is a 89 year old female, admitted due to nausea, vomiting and diarrhea, found to have hypodemia, SIRS and penumonia. Patient with a previous medical history of atrial fibrillation on Coumadin, sick sinus syndrome status post pacemaker placement, hypertension, hyperlipidemia, anxiety, OA of knees, osteopenia, raynaud's syndrome, UI.   Existing Precautions/Restrictions fall   Weight-Bearing Status - LUE full weight-bearing   Weight-Bearing Status - RUE full weight-bearing   Weight-Bearing Status - LLE full weight-bearing   Weight-Bearing Status - RLE full weight-bearing   General Observations PT eval and treat weakness. Activity orders: bedrest with bathroom.   Cognition   Affect/Mental Status (Cognition) WFL   Orientation Status (Cognition) oriented x 4   Follows Commands (Cognition) WFL   Pain Assessment   Patient Currently in Pain Yes, see Vital Sign flowsheet  (severe right hip and knee pain)   Integumentary/Edema   Integumentary/Edema Comments thin frail skin   Posture    Posture Forward head position;Protracted shoulders;Kyphosis   Range of  Motion (ROM)   ROM Comment pain with right hip flexion, knee extension and flexion but functional range. impaired bilateral UE functional use   Strength (Manual Muscle Testing)   Strength (Manual Muscle Testing) Able to perform L SLR;Able to perform R SLR   Strength Comments trunk weakness with bed mobility   Bed Mobility   Bed Mobility supine-sit;sit-supine   Supine-Sit Winkelman (Bed Mobility) minimum assist (75% patient effort)   Sit-Supine Winkelman (Bed Mobility) minimum assist (75% patient effort)   Comment, (Bed Mobility) notes at baseline using her feet hooked into the wheelchair to move out of bed   Transfers   Transfers sit-stand transfer;bed-chair transfer   Bed-Chair Transfer   Bed-Chair Winkelman (Transfers) minimum assist (75% patient effort)   Assistive Device (Bed-Chair Transfers)   (surfaces for 180 degrees squat pivot transfer)   Sit-Stand Transfer   Sit-Stand Winkelman (Transfers) minimum assist (75% patient effort)   Assistive Device (Sit-Stand Transfers)   (surface of chair anterior)   Gait/Stairs (Locomotion)   Comment, (Gait/Stairs) does not ambulate at baseline   Balance   Balance Comments IND short sitting balance. standing heavy UE support and crouched posture   Sensory Examination   Sensory Perception patient reports no sensory changes   Clinical Impression   Criteria for Skilled Therapeutic Intervention Yes, treatment indicated   PT Diagnosis (PT) impaired mobility, right leg pain   Influenced by the following impairments baseline deficit   Functional limitations due to impairments assistance for transfers   Clinical Presentation (PT Evaluation Complexity) Evolving/Changing   Clinical Presentation Rationale progressive hip pain from recent knee pain hospitalization. clinical judgement, medical status   Clinical Decision Making (Complexity) low complexity   Planned Therapy Interventions (PT) ROM (range of motion);strengthening;transfer training   Anticipated Equipment Needs at  Discharge (PT)   (DME as baseline)   Risk & Benefits of therapy have been explained evaluation/treatment results reviewed;participants included;patient   Clinical Impression Comments Patient demonstrates transfers at baseline previously managed by the [facility]. She would benefit from further assessment by medicine of the right hip pathology prior to return to her facility. She would benefit from HHPT services to address RLE impairment, weaknes and transfer safety in the home environment.   PT Total Evaluation Time   PT Eval, Low Complexity Minutes (55406) 30   Physical Therapy Goals   PT Frequency 5x/week   PT Predicted Duration/Target Date for Goal Attainment 02/27/23   PT Goals Transfers;Bed Mobility   PT: Bed Mobility Modified independent;Supine to/from sit   PT: Transfers Supervision/stand-by assist;Bed to/from chair;Assistive device   PT Discharge Planning   PT Plan daily. transfers, LE AROM and stregth   PT Discharge Recommendation (DC Rec) Long term care facility  ([facility] with HHPT versus)   PT Rationale for DC Rec Patient from [facility] at baseline, receiving assistance from staff for transfers per family, patient is wheelchair bound and transfers only due to knee pain. Patient demonstrates transfers at baseline previously managed by the [facility]. She would benefit from further assessment by medicine of the right hip pathology prior to return to her facility. She would benefit from HHPT services to address RLE impairment, weaknes and transfer safety in the home environment.   PT Brief overview of current status MIn assist bed mobility. MIN assist bed to/from chair transfer.   Total Session Time   Total Session Time (sum of timed and untimed services) 30     Thank you for your referral.  Danya Martin, PT, DPT, ATC, LAT    [facility]  O: [phone]  E: [email or web address]

## 2023-02-24 NOTE — PHARMACY-ANTICOAGULATION SERVICE
Clinical Pharmacy - Warfarin Dosing Consult     Pharmacy has been consulted to manage this patient s warfarin therapy.  Indication: Atrial Fibrillation  Therapy Goal: INR 2-3  OP Anticoag Clinic: [facility] vs [facility] (patient's STEPHANY)  Warfarin Prior to Admission: Yes  Warfarin PTA Regimen: 2.5 mg Monday and Friday, and 3.75 mg all other days  Significant drug interactions: Azithromycin  Recent documented change in oral intake/nutrition: Unknown    INR   Date Value Ref Range Status   02/23/2023 4.32 (H) 0.85 - 1.15 Final   02/20/2023 2.68 (H) 0.85 - 1.15 Final       Recommend warfarin NO DOSE today given elevated INR.  Pharmacy will monitor Radha Hennessy daily and order warfarin doses to achieve specified goal.      Please contact pharmacy as soon as possible if the warfarin needs to be held for a procedure or if the warfarin goals change.      Thank you,   Vanessa Rubio RPH on 2/23/2023 at 8:06 PM

## 2023-02-24 NOTE — CONSULTS
Care Management Initial Consult    General Information  Assessment completed with: Patient, Caregiver, Family,    Type of CM/SW Visit: Initial Assessment    Primary Care Provider verified and updated as needed: Yes   Readmission within the last 30 days: current reason for admission unrelated to previous admission   Return Category: New Diagnosis  Reason for Consult: discharge planning  Advance Care Planning: Advance Care Planning Reviewed: no concerns identified        Communication Assessment  Patient's communication style: spoken language (English or Bilingual)    Hearing Difficulty or Deaf: yes   Wear Glasses or Blind: yes    Cognitive  Cognitive/Neuro/Behavioral: WDL                      Living Environment:   People in home: spouse     Current living Arrangements: assisted living  Name of Facility: [facility]   Able to return to prior arrangements: yes       Family/Social Support:  Care provided by: homecare agency, other (see comments) ([facility] staff)  Provides care for: no one, unable/limited ability to care for self  Marital Status:   Other (specify) (Paz Terry)          Description of Support System: Supportive, Involved    Support Assessment: Adequate family and caregiver support    Current Resources:   Patient receiving home care services: Yes  Skilled Home Care Services: Physicial Therapy, Occupational Therapy  Community Resources: None  Equipment currently used at home: grab bar, toilet, raised toilet seat, wheelchair, manual  Supplies currently used at home:      Employment/Financial:  Employment Status: retired        Financial Concerns:         Lifestyle & Psychosocial Needs:  Social Determinants of Health     Tobacco Use: Unknown     Smoking Tobacco Use: Never     Smokeless Tobacco Use: Unknown     Passive Exposure: Not on file   Alcohol Use: Not on file   Financial Resource Strain: Not on file   Food Insecurity: Not on file   Transportation Needs: Not on file   Physical Activity: Not on  file   Stress: Not on file   Social Connections: Not on file   Intimate Partner Violence: Not on file   Depression: Not on file   Housing Stability: Not on file       Functional Status:  Prior to admission patient needed assistance:          Mental Health Status:          Chemical Dependency Status:              Values/Beliefs:  Spiritual, Cultural Beliefs, Druze Practices, Values that affect care: no               Additional Information:  Referral received for re-admission and discharge planning.    This is patient's 3rd admission this week.    Patient live at [facility] with her .    Patient admitted with diarrhea, vomiting. Patient has pneumonia.  Patient complaining of pain all over.    Patient has chronic ankle and knee pain.    Met with patient and talked with Paz salazar, by phone.  Plan is for patient to return to [facility] at discharge and resume current [facility] services. Patient does not meet TCU criteria.  Patient does not want Hospice.    [facility] staff assist patient with medications and personal cares.  Up to this point, patient has been refusing to pay for additional services.    Patient's PCP is the [facility] Danyell farfan NP.    Patient receives services through [facility], who own [facility].  [facility] provides SW, PT/OT.  To resume these services, orders will need to be added to discharge orders.    Spoke with Rupert, at [facility] to update on plan of care.  Rupert state patient will not be able to re-admit over the weekend due to no RN on site or on-call to re-admit.  Reviewed [place] law with Rupert.  They repeated that there will be no RN to re-admit patient.  They also stated that if there is any change in care for patient (including an oral abx), they can not re-admit without a RN assessment.  In addition, they stated they can not get any new medications for patient over the weekend as they have no pharmacy.  Sandra abel  Katy state that patient's medications would need to be bubble packed filled at [facility] and family would need to pick them up.  After lengthy discussion, Katy and Sandra stated that patient is welcome to return on the weekend, however, there will not be a nurse to re-admit her until Monday.    Call placed to Office of [facility] [phone].  This the intake line.  Intake received call and will pass message to the [facility] for this area, Rico Han.    Patient's niece, Paz, states she will not be available to transport patient back on the weekend, but is available by phone for payment.    Called [facility] transport and spoke to Charito.  Charito states they will have a  on Saturday, but are not doing rides on Sunday.    Update: [id number] - Fckp a voicemail for Rupert, RNs at [facility] to update them that patient will likely be ready for discharge tomorrow.  New medication will be an oral antibiotic.    DANTE Holloway  [facility] [phone]/ [facility] [phone]  Care Management

## 2023-02-24 NOTE — PROGRESS NOTES
S-(situation): Patient arrives to room 270 via cart from ED    B-(background): Pneumonia, hypoxia, L knee pain    A-(assessment): Patient is alert, stable. Vitals completed and settled in bed. Will have next nurse finish assessment/skin assessment.     R-(recommendations): Orders reviewed with Patient. Will monitor patient per MD orders.     Inpatient nursing criteria listed below were met:

## 2023-02-24 NOTE — H&P
"[facility]    History and Physical - Hospitalist Service       Date of Admission:  2/23/2023    Assessment & Plan      Radha Hennessy is a 89 year old female with PMH; SSS, PPM, PAF, DJD, Chronic AC, SBO, Anxiety, Recurrent UTI. Presented to the ED at [facility] with CC vomiting and diarrhea. Pt was incontinent of stool upon arrival. Pt cannot give any history as she is very [place] with hearing aids in place.     LOVELL in the ED revealed the following; Pt was febrile on adm T=101.4, sat 89%, HR 77   She was negative for Influenza, RSV and Covid, BUN 37, Scr 0.69, LA 1.4, mg 1.4. WBC 4.0  H&H 12.1/37.5, pCXR shows patchy nodular opacities throughout right lung and LLL    Pt is admitted to the Hospitalist service with the following;    # Acute hypoxic respiratory failure. O2 sat 89% on RA. Secondary to bilateral pneumonia  Supplemental O2  Rocephin and Zithromax     # Acute bilateral CAP-POA  Rocephin 1 gm IV q24h and Zithromax 500 mg IV q24  Supplemental O2 titrate/wean ASAP  Nebs PRN    # SIRS POA  Fever 101.4  Hypoxia 89% RA  Tachypnea 20/min  She was not tachycardic, however, she takes beta blocker likely would not develop tachycardia        # Intermittent atrial fib.-chronic  Rate controlled  Continue rate controlling meds    # Chronic anticoagulation  Checking INR.  Will ask Pharmacy to manage.  INR 2-3 for a-fib     # HTN:   Continue antihypertensives    # Hypomagnesemia  Nurse driven replacement protocol     # DJD: Polyarticular knees, feet, elbows, hands  Conservative management, scheduled tylenol and Lidocaine patch     # Frail Elderly:   Lives in shelter with spouse that has medical issues  DJD and osteopenia contribute to fraility.  PT/OT evaluations  Anticipate return to her shelter    # [place]:   Interferes with communication     I asked the pt about her wishes re; in hospital resp and or cardiac arrest. She stated \"I have to stay alive so I can sell my house.\"  Her last " hospitalization, she was full code. She does understand questions     Diet:   Reg RENETTA   DVT Prophylaxis: Warfarin  Godinez Catheter: Not present  Lines: None     Cardiac Monitoring: None  Code Status:  Full code     Clinically Significant Risk Factors Present on Admission            # Hypomagnesemia: Lowest Mg = 1.5 mg/dL in last 2 days, will replace as needed    # Drug Induced Coagulation Defect: home medication list includes an anticoagulant medication    # Hypertension: home medication list includes antihypertensive(s)              Disposition Plan    Treat PNA, O2 titrate/wean, dismiss back to her residential      Expected Discharge Date: 02/25/2023                The patient's care was discussed with the Attending Physician, Dr. Jauregui.    JERSON Goff [facility]  Hospitalist Service  [facility]  Securely message with [employer] (more info)  Text page via [facility] Paging/Directory     ______________________________________________________________________    Chief Complaint   Nausea, vomiting, loose stools     History is obtained from the patient    History of Present Illness   Radha Hennessy is a 89 year old female who presented to the ED at [facility] with CC N/V loose stool. She was found to have bilateral PNA. She is being admitted to the Hospitalist service.       Past Medical History    Past Medical History:   Diagnosis Date     Anxiety      Chronic anticoagulation      DJD (degenerative joint disease) of knee      HTN (hypertension)      Mitral insufficiency      Osteopenia      PAF (paroxysmal atrial fibrillation) (H)      Raynaud's syndrome      Recurrent UTI      SBO (small bowel obstruction) (H)      SSS (sick sinus syndrome) (H)      Urinary incontinence        Past Surgical History   Past Surgical History:   Procedure Laterality Date     APPENDECTOMY       AS LAP,LYSIS OF ADHESIONS       CHOLECYSTECTOMY       HYSTERECTOMY       IMPLANT PACEMAKER       STAPEDECTOMY        TONSILLECTOMY         Prior to Admission Medications   Prior to Admission Medications   Prescriptions Last Dose Informant Patient Reported? Taking?   Diaper Rash Products (A+D DIAPER RASH) CREA   Yes No   HYDROcodone-acetaminophen (NORCO) 5-325 MG tablet   No No   Sig: Take 1 tablet by mouth nightly as needed for severe pain (7-10)   Lidocaine (LIDOCARE) 4 % Patch   No No   Sig: Place 1 patch onto the skin every 24 hours To prevent lidocaine toxicity, patient should be patch free for 12 hrs daily.   acetaminophen (TYLENOL) 650 MG CR tablet 2/23/2023 at am  Yes Yes   Sig: Take 1,300 mg by mouth   albuterol (PROAIR HFA/PROVENTIL HFA/VENTOLIN HFA) 108 (90 Base) MCG/ACT inhaler 2/23/2023  Yes Yes   Sig: Inhale 2 puffs into the lungs   calcium carbonate antacid 1000 MG CHEW 2/22/2023 at hs  Yes Yes   Sig: Take 1,000 mg by mouth   carvedilol (COREG) 6.25 MG tablet   Yes No   Sig: Take 6.25 mg by mouth 2 times daily (with meals)   cholecalciferol (VITAMIN D3) 25 mcg (1000 units) capsule   Yes No   Sig: Take 1 capsule by mouth every evening   ketoconazole (NIZORAL) 2 % external cream   Yes No   lactulose (CHRONULAC) 10 GM/15ML solution   Yes No   Sig: Take 30 mLs by mouth   lisinopril (ZESTRIL) 20 MG tablet   Yes No   Sig: Take 20 mg by mouth 2 times daily   omeprazole (PRILOSEC) 20 MG DR capsule   Yes No   Sig: Take 20 mg by mouth daily   polyethylene glycol (MIRALAX) 17 GM/Dose powder 2/22/2023 at hs  Yes Yes   Sig: Take 17 g by mouth   senna-docusate (SENOKOT-S/PERICOLACE) 8.6-50 MG tablet 2/22/2023 at hs  Yes Yes   Sig: Take 2 tablets by mouth   vitamin B-12 (CYANOCOBALAMIN) 1000 MCG tablet   Yes No   Sig: Take 1,000 mcg by mouth   warfarin ANTICOAGULANT (COUMADIN) 2.5 MG tablet   Yes No   Sig: Take 2.5 mg by mouth twice a week Monday & Friday   warfarin ANTICOAGULANT (COUMADIN) 7.5 MG tablet   Yes No   Sig: Take 3.75 mg by mouth Sunday, Tuesday, Wednesday, Thursday, Saturday      Facility-Administered Medications:  None        Review of Systems    CONSTITUTIONAL:  negative for  fevers and chills  EYES:  negative for  blurred vision and visual disturbance  HEENT:  Endorses chronic hearing loss  RESPIRATORY:  negative for  cough with sputum and dyspnea  CARDIOVASCULAR:  negative for  chest pain  GASTROINTESTINAL:  Endorses frequent loose stools. Denies abd pain or cramping  GENITOURINARY:  Endorses chronic urinary incontinence  HEMATOLOGIC/LYMPHATIC:  negative for easy bruising and bleeding  MUSCULOSKELETAL:  positive for  arthralgias, pain and stiff joints  NEUROLOGICAL:  Denies lightheadedness or dizziness      Physical Exam   Vital Signs: Temp: (!) 101.4  F (38.6  C) Temp src: Oral BP: (!) 172/75 Pulse: 77   Resp: 20 SpO2: (!) 89 % O2 Device: Nasal cannula Oxygen Delivery: 1 LPM  Weight: 0 lbs 0 oz    Constitutional: 89 yo ferail appearing female lying in bed, O2 is on, she does not appear to be in acute distress  Eyes: sclera clear and conjunctiva normal  ENT: Normocephalic, without obvious abnormality, buccal membranes are dry   Hematologic / Lymphatic: No bleeding or bruising   Respiratory: Lung sounds a decreased at bases. No wheezing, rales or ronchi   Cardiovascular: Normal apical impulse, regular rate and rhythm, normal S1 and S2, no S3 or S4, and no murmur noted  GI: scars noted Midline and right abd , hypoactive bowel sounds, soft, non-distended and non-tender  Skin: no bruising or bleeding, normal skin color, texture, turgor, no redness, warmth, or swelling and no rashes  Musculoskeletal: No leg swelling. Right knee is tender to palpate, no swelling. No other gross bony abnormalities  Neurologic: Awake. Very [place] and very difficult to communicate with    Medical Decision Making       55 MINUTES SPENT BY ME on the date of service doing chart review, history, exam, documentation & further activities per the note.      Data     I have personally reviewed the following data over the past 24 hrs:    4.0  \   12.1   / 215      144 105 37.1 (H) /  109 (H)   3.7 24 0.69 \       ALT: 14 AST: 26 AP: 68 TBILI: 0.8   ALB: 3.9 TOT PROTEIN: 6.3 (L) LIPASE: N/A       Procal: N/A CRP: N/A Lactic Acid: 1.4         Imaging results reviewed over the past 24 hrs:   Recent Results (from the past 24 hour(s))   XR Chest Port 1 View    Narrative    XR CHEST PORT 1 VIEW   2/23/2023 3:11 PM     HISTORY: fever; shortness of breath    COMPARISON: None.      Impression    IMPRESSION: Cardiac silhouette at the upper limits of normal in size.  Pacemaker leads overlying the right atrium and right ventricle. Patchy  nodular opacities throughout the right lung and in the left lower  lobe, favor pneumonia but recommend radiographic follow-up to  resolution to exclude underlying lesions. No pleural effusion or  pneumothorax. No acute bony abnormality. 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    JAKE COLON MD         SYSTEM ID:  [id number]

## 2023-02-25 ENCOUNTER — APPOINTMENT (OUTPATIENT)
Dept: PHYSICAL THERAPY | Facility: CLINIC | Age: 88
DRG: 193 | End: 2023-02-25
Payer: MEDICARE

## 2023-02-25 LAB
ANION GAP SERPL CALCULATED.3IONS-SCNC: 7 MMOL/L (ref 7–15)
BASOPHILS # BLD AUTO: 0 10E3/UL (ref 0–0.2)
BASOPHILS NFR BLD AUTO: 0 %
BUN SERPL-MCNC: 31.4 MG/DL (ref 8–23)
CALCIUM SERPL-MCNC: 8.4 MG/DL (ref 8.2–9.6)
CHLORIDE SERPL-SCNC: 107 MMOL/L (ref 98–107)
CREAT SERPL-MCNC: 0.75 MG/DL (ref 0.51–0.95)
DEPRECATED HCO3 PLAS-SCNC: 25 MMOL/L (ref 22–29)
EOSINOPHIL # BLD AUTO: 0.1 10E3/UL (ref 0–0.7)
EOSINOPHIL NFR BLD AUTO: 1 %
ERYTHROCYTE [DISTWIDTH] IN BLOOD BY AUTOMATED COUNT: 12.4 % (ref 10–15)
GFR SERPL CREATININE-BSD FRML MDRD: 72 ML/MIN/1.73M2
GLUCOSE SERPL-MCNC: 85 MG/DL (ref 70–99)
HCT VFR BLD AUTO: 29.9 % (ref 35–47)
HEMOCCULT SP1 STL QL: NEGATIVE
HGB BLD-MCNC: 9.1 G/DL (ref 11.7–15.7)
IMM GRANULOCYTES # BLD: 0 10E3/UL
IMM GRANULOCYTES NFR BLD: 0 %
INR PPP: 4.28 (ref 0.85–1.15)
LYMPHOCYTES # BLD AUTO: 0.8 10E3/UL (ref 0.8–5.3)
LYMPHOCYTES NFR BLD AUTO: 11 %
MAGNESIUM SERPL-MCNC: 1.9 MG/DL (ref 1.7–2.3)
MCH RBC QN AUTO: 31.2 PG (ref 26.5–33)
MCHC RBC AUTO-ENTMCNC: 30.4 G/DL (ref 31.5–36.5)
MCV RBC AUTO: 102 FL (ref 78–100)
MONOCYTES # BLD AUTO: 0.5 10E3/UL (ref 0–1.3)
MONOCYTES NFR BLD AUTO: 7 %
NEUTROPHILS # BLD AUTO: 5.9 10E3/UL (ref 1.6–8.3)
NEUTROPHILS NFR BLD AUTO: 81 %
NRBC # BLD AUTO: 0 10E3/UL
NRBC BLD AUTO-RTO: 0 /100
PLATELET # BLD AUTO: 151 10E3/UL (ref 150–450)
POTASSIUM SERPL-SCNC: 3.3 MMOL/L (ref 3.4–5.3)
POTASSIUM SERPL-SCNC: 3.7 MMOL/L (ref 3.4–5.3)
RBC # BLD AUTO: 2.92 10E6/UL (ref 3.8–5.2)
SODIUM SERPL-SCNC: 139 MMOL/L (ref 136–145)
WBC # BLD AUTO: 7.3 10E3/UL (ref 4–11)

## 2023-02-25 PROCEDURE — 85610 PROTHROMBIN TIME: CPT | Performed by: NURSE PRACTITIONER

## 2023-02-25 PROCEDURE — 84132 ASSAY OF SERUM POTASSIUM: CPT | Performed by: FAMILY MEDICINE

## 2023-02-25 PROCEDURE — 99233 SBSQ HOSP IP/OBS HIGH 50: CPT | Performed by: NURSE PRACTITIONER

## 2023-02-25 PROCEDURE — 250N000013 HC RX MED GY IP 250 OP 250 PS 637: Performed by: NURSE PRACTITIONER

## 2023-02-25 PROCEDURE — 83735 ASSAY OF MAGNESIUM: CPT | Performed by: FAMILY MEDICINE

## 2023-02-25 PROCEDURE — 36415 COLL VENOUS BLD VENIPUNCTURE: CPT | Performed by: NURSE PRACTITIONER

## 2023-02-25 PROCEDURE — 84132 ASSAY OF SERUM POTASSIUM: CPT | Performed by: NURSE PRACTITIONER

## 2023-02-25 PROCEDURE — 82270 OCCULT BLOOD FECES: CPT | Performed by: NURSE PRACTITIONER

## 2023-02-25 PROCEDURE — 250N000013 HC RX MED GY IP 250 OP 250 PS 637: Performed by: PEDIATRICS

## 2023-02-25 PROCEDURE — 250N000011 HC RX IP 250 OP 636: Performed by: EMERGENCY MEDICINE

## 2023-02-25 PROCEDURE — 120N000001 HC R&B MED SURG/OB

## 2023-02-25 PROCEDURE — 85025 COMPLETE CBC W/AUTO DIFF WBC: CPT | Performed by: NURSE PRACTITIONER

## 2023-02-25 PROCEDURE — 36415 COLL VENOUS BLD VENIPUNCTURE: CPT | Performed by: FAMILY MEDICINE

## 2023-02-25 PROCEDURE — 97530 THERAPEUTIC ACTIVITIES: CPT | Mod: GP | Performed by: PHYSICAL THERAPIST

## 2023-02-25 PROCEDURE — 250N000013 HC RX MED GY IP 250 OP 250 PS 637: Performed by: FAMILY MEDICINE

## 2023-02-25 RX ORDER — LIDOCAINE 4 G/G
2 PATCH TOPICAL
Status: DISCONTINUED | OUTPATIENT
Start: 2023-02-25 | End: 2023-02-27 | Stop reason: HOSPADM

## 2023-02-25 RX ORDER — ACETAMINOPHEN 325 MG/1
975 TABLET ORAL EVERY 8 HOURS
Status: DISCONTINUED | OUTPATIENT
Start: 2023-02-25 | End: 2023-02-27 | Stop reason: HOSPADM

## 2023-02-25 RX ORDER — POTASSIUM CHLORIDE 1500 MG/1
20 TABLET, EXTENDED RELEASE ORAL ONCE
Status: COMPLETED | OUTPATIENT
Start: 2023-02-25 | End: 2023-02-25

## 2023-02-25 RX ORDER — AZITHROMYCIN 250 MG/1
500 TABLET, FILM COATED ORAL DAILY
Status: DISCONTINUED | OUTPATIENT
Start: 2023-02-25 | End: 2023-02-26

## 2023-02-25 RX ADMIN — POTASSIUM CHLORIDE 20 MEQ: 1500 TABLET, EXTENDED RELEASE ORAL at 08:13

## 2023-02-25 RX ADMIN — OXYCODONE HYDROCHLORIDE 2.5 MG: 5 TABLET ORAL at 04:38

## 2023-02-25 RX ADMIN — LISINOPRIL 20 MG: 10 TABLET ORAL at 21:32

## 2023-02-25 RX ADMIN — ACETAMINOPHEN 975 MG: 325 TABLET ORAL at 21:32

## 2023-02-25 RX ADMIN — ACETAMINOPHEN 975 MG: 325 TABLET ORAL at 14:02

## 2023-02-25 RX ADMIN — CEFTRIAXONE SODIUM 2 G: 2 INJECTION, POWDER, FOR SOLUTION INTRAMUSCULAR; INTRAVENOUS at 17:21

## 2023-02-25 RX ADMIN — Medication: at 23:38

## 2023-02-25 RX ADMIN — CARVEDILOL 6.25 MG: 6.25 TABLET, FILM COATED ORAL at 17:22

## 2023-02-25 RX ADMIN — CARVEDILOL 6.25 MG: 6.25 TABLET, FILM COATED ORAL at 08:13

## 2023-02-25 RX ADMIN — ALBUTEROL SULFATE 2 PUFF: 90 AEROSOL, METERED RESPIRATORY (INHALATION) at 08:13

## 2023-02-25 RX ADMIN — PANTOPRAZOLE SODIUM 40 MG: 40 TABLET, DELAYED RELEASE ORAL at 21:32

## 2023-02-25 RX ADMIN — LISINOPRIL 20 MG: 10 TABLET ORAL at 08:13

## 2023-02-25 RX ADMIN — LIDOCAINE 2 PATCH: 560 PATCH PERCUTANEOUS; TOPICAL; TRANSDERMAL at 17:22

## 2023-02-25 RX ADMIN — AZITHROMYCIN 500 MG: 250 TABLET, FILM COATED ORAL at 14:02

## 2023-02-25 RX ADMIN — ACETAMINOPHEN 975 MG: 325 TABLET ORAL at 07:08

## 2023-02-25 RX ADMIN — ALBUTEROL SULFATE 2 PUFF: 90 AEROSOL, METERED RESPIRATORY (INHALATION) at 21:33

## 2023-02-25 ASSESSMENT — ACTIVITIES OF DAILY LIVING (ADL)
ADLS_ACUITY_SCORE: 41
ADLS_ACUITY_SCORE: 44
ADLS_ACUITY_SCORE: 41
ADLS_ACUITY_SCORE: 41
ADLS_ACUITY_SCORE: 44
ADLS_ACUITY_SCORE: 44
ADLS_ACUITY_SCORE: 41

## 2023-02-25 NOTE — PLAN OF CARE
Problem: Plan of Care - These are the overarching goals to be used throughout the patient stay.    Goal: Plan of Care Review  Description: The Plan of Care Review/Shift note should be completed every shift.  The Outcome Evaluation is a brief statement about your assessment that the patient is improving, declining, or no change.  This information will be displayed automatically on your shift note.  Outcome: Progressing  Flowsheets (Taken 2/25/2023 9433)  Outcome Evaluation: Just before 0500 patient complained of SOB after changing brief and purewick. Sats on RA 89%. Patient anxious as well. Resperations 24 and HTN. Oxygen applied NC with 2L with sats between 92-96%. Resperations slowly decreasing to 20. BP down a little. Assymptomatic with HTN. Patient reports feeling better. No change on Tele. Writing nurse sat with patient. Scheduled Tylenol was changed to later by pharmacy due to reaching max Tylenol dose in 24 hours. PRN oxycodone given. Patient fell asleep shortly after. Patient didn't sleep until this point. Charge nurse updated. Chart review and noted BP has been up before and patient due for HTN medications in the AM.  Plan of Care Reviewed With: patient  Overall Patient Progress: no change

## 2023-02-25 NOTE — PROGRESS NOTES
[facility]    Medicine Progress Note - Hospitalist Service    Date of Admission:  2/23/2023    Assessment & Plan      Radha Hennessy is a 89 year old female with PMH; SSS, PPM, PAF, DJD, Chronic AC, SBO, Anxiety, Recurrent UTI. Presented to the ED at [facility] with CC vomiting and diarrhea and she was subsequently admitted to the hospital for acute hypoxic respiratory failure secondary to bilateral pneumonia.      # Acute hypoxic respiratory failure. O2 sat 89% on RA. Secondary to bilateral pneumonia    Continues to require supplemental oxygen with 2L with 96% oxygenation    We will continue to titrate off O2 to maintain saturations greater than 92%.    Continue Rocephin and Zithromax      # Acute bilateral CAP-POA    Rocephin 1 gm IV q24h and Zithromax 500 mg IV q24    Supplemental O2 titrate/wean ASAP    Nebs PRN    BC x2 at 12h    Stop IVF      # SIRS POA  Fever 101.4 In ED     Hypoxia 89% RA    Tachypnea 20/min    She was not tachycardic, however, she takes beta blocker likely would not develop tachycardia    T-Max 99.0          # Intermittent atrial fib.-chronic    Rate controlled    Continue rate controlling meds     # Chronic anticoagulation  Pharmacy managing    Goal INR 2-3 for a-fib     INR 4.28 Coumadin on hold, recheck in a.m.     # HTN:     Continue antihypertensives     # Hypomagnesemia    Nurse driven replacement protocol      # DJD: Polyarticular knees, feet, elbows, hands    Conservative management, scheduled tylenol and Lidocaine patch    C/O Right hip pain. No reports of fall. X-ray hip/pelvis      # Frail Elderly:     Lives in [place] with spouse that has medical issues    DJD and osteopenia contribute to fraility.    PT/OT evaluations    Continued PT for right lower extremity impairment    Anticipate return to her [place]     # [place]:     Interferes with communication     # Anemia: Normocytic/normochromic      Had 3 gm drop in Hgb from admission. 12.1> 9.2      Last 3 visits to ED she has had mild anemia     She is not symptomatic    Check stools for occult blood     Monitor. Transfuse if hgb < 7.0 and or symptomatic         Diet: Combination Diet Regular Diet Adult    DVT Prophylaxis: Warfarin  Godinez Catheter: Not present  Lines: None     Cardiac Monitoring: ACTIVE order. Indication: SIRS  Code Status: Full Code      Clinically Significant Risk Factors        # Hypokalemia: Lowest K = 3.1 mmol/L in last 2 days, will replace as needed   # Hypocalcemia: Lowest Ca = 8.4 mg/dL in last 2 days, will monitor and replace as appropriate   # Hypomagnesemia: Lowest Mg = 1.5 mg/dL in last 2 days, will replace as needed                      Disposition Plan     Expected Discharge Date: 02/25/2023      Destination: assisted living          The patient's care was discussed with the Attending Physician, Dr. Rea, Bedside Nurse and Care Coordinator/.    Reji Pichardo NP  Hospitalist Service  [facility]  Securely message with Vocera (more info)  Text page via [facility] Paging/Directory   ______________________________________________________________________    Interval History   2/25 Reviewed overnight notes. No significant events patient states that she is apprehensive of returning to her living situation and that she would like her niece to find her and her  alternative living situations.  She continues to be upset about having to leave her home and dislikes her current facility.  A call was placed to her niece, Paz, to inform her of her aunts condition but there was no answer at her phone.      Physical Exam   Vital Signs: Temp: 96.9  F (36.1  C) Temp src: Oral BP: (!) 146/64 Pulse: 63   Resp: 20 SpO2: 90 % O2 Device: Nasal cannula with humidification Oxygen Delivery: 1 LPM  Weight: 128 lbs 4.92 oz    Constitutional: 89 yo female in bed. She is currently on 1 L O2 and is not in any distress  Eyes: sclera clear and conjunctiva  normal  Hematologic / Lymphatic: No active bleeding or bruising   Respiratory: BS diminished bilaterally CTA, no wheezing, rales or ronchi  Cardiovascular: Normal apical impulse, regular rate and rhythm, normal S1 and S2, pacemaker in place  GI: normal bowel sounds, soft, non-distended and non-tender  Skin: no bruising or bleeding, normal skin color, texture, turgor, no pallor   Musculoskeletal: Palpation of the right hip-non-tender  Neurologic: Alert and oriented. Conversation is appropriate. She is very [place]     Medical Decision Making       50 MINUTES SPENT BY ME on the date of service doing chart review, history, exam, documentation & further activities per the note.      Data     I have personally reviewed the following data over the past 24 hrs:    7.3  \   9.1 (L)   / 151     139 107 31.4 (H) /  85   3.7 25 0.75 \       INR:  4.28 (H) PTT:  N/A   D-dimer:  N/A Fibrinogen:  N/A       Imaging results reviewed over the past 24 hrs:   Recent Results (from the past 24 hour(s))   XR Pelvis 1/2 Views    Narrative    EXAM: PELVIS ONE TO TWO VIEWS  DATE/TIME: 2/24/2023 1:14 PM    INDICATION: Right hip pain.  COMPARISON: None available.       Impression    IMPRESSION: No acute displaced pelvic or proximal femoral fracture  identified. Diffuse bone demineralization. Moderate bilateral hip  osteoarthritis. Both obturator rings intact. Degenerative change lower  lumbar spine. Arterial calcification.       DEBBIE BROWN MD         SYSTEM ID:  [id number]

## 2023-02-25 NOTE — PROGRESS NOTES
"Care Management Follow Up    Length of Stay (days): 2    Expected Discharge Date: 02/25/2023     Concerns to be Addressed: discharge planning       Patient plan of care discussed at interdisciplinary rounds: Yes    Anticipated Discharge Disposition: Assisted Living, Home Care  Disposition Comments: is seen by the rounder at the [facility]    Anticipated Discharge Services:  Resume [facility]- PT, OT, SW     Anticipated Discharge DME:  None     Patient/family educated on Medicare website which has current facility and service quality ratings: N/A      Education Provided on the Discharge Plan: yes     Patient/Family in Agreement with the Plan: yes    Referrals Placed by CM/SW: External Care Coordination    Private pay costs discussed: transportation costs    Additional Information:  Care Management continues to follow for discharge planning.      Care Management office received voicemail from Rico greene #[phone], last evening at 4:53pm.  Rico's voicemail stated that \"the assisted living should take patient back and have a nurse on call for the weekend\".  Rico is not in the office on the weekends.  Care Management to call Rico back on Monday, 2/27.         DANTE Simental  [facility]   [phone]     "

## 2023-02-25 NOTE — PLAN OF CARE
Goal Outcome Evaluation:      Plan of Care Reviewed With: patient    Overall Patient Progress: improvingOverall Patient Progress: improving    Outcome Evaluation: Pt A&Ox4. VSS on RA. LS have fine crackles in bases. IV abx: zithromax and rocephin. Lido patch x2 to R knee and hip. Sarasteady to bathroom. A-1 pivot transfer to commode. Does not have much of an appetite, likes ensures to supplement her meals. Incontinent of urine, brief changes throughout day.

## 2023-02-25 NOTE — PHARMACY-ANTICOAGULATION SERVICE
Clinical Pharmacy - Warfarin Dosing Consult     Pharmacy has been consulted to manage this patient s warfarin therapy.  Indication: Atrial Fibrillation  Therapy Goal: INR 2-3  OP Anticoag Clinic: [facility] vs [facility] (patient's halfway)  Warfarin Prior to Admission: Yes  Warfarin PTA Regimen: 2.5 mg Monday and Friday, and 3.75 mg all other days  Significant drug interactions: Azithromycin  Recent documented change in oral intake/nutrition: Unknown    INR   Date Value Ref Range Status   02/25/2023 4.28 (H) 0.85 - 1.15 Final   02/24/2023 4.68 (H) 0.85 - 1.15 Final       Recommend warfarin NO DOSE today.  Pharmacy will monitor Radha Hennessy daily and order warfarin doses to achieve specified goal.      Please contact pharmacy as soon as possible if the warfarin needs to be held for a procedure or if the warfarin goals change.

## 2023-02-26 ENCOUNTER — APPOINTMENT (OUTPATIENT)
Dept: PHYSICAL THERAPY | Facility: CLINIC | Age: 88
DRG: 193 | End: 2023-02-26
Payer: MEDICARE

## 2023-02-26 LAB
HGB BLD-MCNC: 9.9 G/DL (ref 11.7–15.7)
HOLD SPECIMEN: NORMAL
INR PPP: 2.93 (ref 0.85–1.15)
MAGNESIUM SERPL-MCNC: 1.8 MG/DL (ref 1.7–2.3)
POTASSIUM SERPL-SCNC: 3.8 MMOL/L (ref 3.4–5.3)

## 2023-02-26 PROCEDURE — 85610 PROTHROMBIN TIME: CPT | Performed by: NURSE PRACTITIONER

## 2023-02-26 PROCEDURE — 120N000001 HC R&B MED SURG/OB

## 2023-02-26 PROCEDURE — 83735 ASSAY OF MAGNESIUM: CPT | Performed by: FAMILY MEDICINE

## 2023-02-26 PROCEDURE — 250N000013 HC RX MED GY IP 250 OP 250 PS 637: Performed by: NURSE PRACTITIONER

## 2023-02-26 PROCEDURE — 97116 GAIT TRAINING THERAPY: CPT | Mod: GP | Performed by: PHYSICAL THERAPIST

## 2023-02-26 PROCEDURE — 250N000011 HC RX IP 250 OP 636: Performed by: INTERNAL MEDICINE

## 2023-02-26 PROCEDURE — 99232 SBSQ HOSP IP/OBS MODERATE 35: CPT | Performed by: NURSE PRACTITIONER

## 2023-02-26 PROCEDURE — 36415 COLL VENOUS BLD VENIPUNCTURE: CPT | Performed by: NURSE PRACTITIONER

## 2023-02-26 PROCEDURE — 84132 ASSAY OF SERUM POTASSIUM: CPT | Performed by: FAMILY MEDICINE

## 2023-02-26 PROCEDURE — 97530 THERAPEUTIC ACTIVITIES: CPT | Mod: GP | Performed by: PHYSICAL THERAPIST

## 2023-02-26 PROCEDURE — 85018 HEMOGLOBIN: CPT | Performed by: NURSE PRACTITIONER

## 2023-02-26 PROCEDURE — 250N000013 HC RX MED GY IP 250 OP 250 PS 637: Performed by: FAMILY MEDICINE

## 2023-02-26 RX ORDER — HYDROXYZINE HYDROCHLORIDE 50 MG/1
50 TABLET, FILM COATED ORAL EVERY 6 HOURS PRN
Status: DISCONTINUED | OUTPATIENT
Start: 2023-02-26 | End: 2023-02-27 | Stop reason: HOSPADM

## 2023-02-26 RX ORDER — POTASSIUM CHLORIDE 1500 MG/1
20 TABLET, EXTENDED RELEASE ORAL ONCE
Status: COMPLETED | OUTPATIENT
Start: 2023-02-26 | End: 2023-02-26

## 2023-02-26 RX ORDER — HYDROXYZINE HYDROCHLORIDE 25 MG/1
25 TABLET, FILM COATED ORAL EVERY 6 HOURS PRN
Status: DISCONTINUED | OUTPATIENT
Start: 2023-02-26 | End: 2023-02-27 | Stop reason: HOSPADM

## 2023-02-26 RX ORDER — WARFARIN SODIUM 2 MG/1
4 TABLET ORAL
Status: COMPLETED | OUTPATIENT
Start: 2023-02-26 | End: 2023-02-26

## 2023-02-26 RX ORDER — CEFDINIR 300 MG/1
300 CAPSULE ORAL DAILY
Qty: 6 CAPSULE | Refills: 0 | Status: SHIPPED | OUTPATIENT
Start: 2023-02-27 | End: 2023-03-05

## 2023-02-26 RX ORDER — LORAZEPAM 0.5 MG/1
0.25 TABLET ORAL
Status: DISCONTINUED | OUTPATIENT
Start: 2023-02-26 | End: 2023-02-27 | Stop reason: HOSPADM

## 2023-02-26 RX ORDER — CEFDINIR 300 MG/1
300 CAPSULE ORAL DAILY
Status: DISCONTINUED | OUTPATIENT
Start: 2023-02-26 | End: 2023-02-27 | Stop reason: HOSPADM

## 2023-02-26 RX ORDER — HYDRALAZINE HYDROCHLORIDE 20 MG/ML
5 INJECTION INTRAMUSCULAR; INTRAVENOUS EVERY 4 HOURS PRN
Status: DISCONTINUED | OUTPATIENT
Start: 2023-02-26 | End: 2023-02-27 | Stop reason: HOSPADM

## 2023-02-26 RX ADMIN — LIDOCAINE 2 PATCH: 560 PATCH PERCUTANEOUS; TOPICAL; TRANSDERMAL at 08:28

## 2023-02-26 RX ADMIN — PANTOPRAZOLE SODIUM 40 MG: 40 TABLET, DELAYED RELEASE ORAL at 21:04

## 2023-02-26 RX ADMIN — POTASSIUM CHLORIDE 20 MEQ: 1500 TABLET, EXTENDED RELEASE ORAL at 16:10

## 2023-02-26 RX ADMIN — CARVEDILOL 6.25 MG: 6.25 TABLET, FILM COATED ORAL at 17:11

## 2023-02-26 RX ADMIN — HYDROXYZINE HYDROCHLORIDE 25 MG: 25 TABLET ORAL at 17:11

## 2023-02-26 RX ADMIN — ALBUTEROL SULFATE 2 PUFF: 90 AEROSOL, METERED RESPIRATORY (INHALATION) at 09:58

## 2023-02-26 RX ADMIN — HYDRALAZINE HYDROCHLORIDE 5 MG: 20 INJECTION INTRAMUSCULAR; INTRAVENOUS at 01:54

## 2023-02-26 RX ADMIN — LISINOPRIL 20 MG: 10 TABLET ORAL at 08:13

## 2023-02-26 RX ADMIN — ACETAMINOPHEN 975 MG: 325 TABLET ORAL at 21:04

## 2023-02-26 RX ADMIN — OXYCODONE HYDROCHLORIDE 2.5 MG: 5 TABLET ORAL at 08:13

## 2023-02-26 RX ADMIN — Medication: at 19:58

## 2023-02-26 RX ADMIN — WARFARIN SODIUM 4 MG: 2 TABLET ORAL at 17:11

## 2023-02-26 RX ADMIN — HYDRALAZINE HYDROCHLORIDE 5 MG: 20 INJECTION INTRAMUSCULAR; INTRAVENOUS at 19:58

## 2023-02-26 RX ADMIN — CEFDINIR 300 MG: 300 CAPSULE ORAL at 10:00

## 2023-02-26 RX ADMIN — ACETAMINOPHEN 975 MG: 325 TABLET ORAL at 06:48

## 2023-02-26 RX ADMIN — HYDRALAZINE HYDROCHLORIDE 5 MG: 20 INJECTION INTRAMUSCULAR; INTRAVENOUS at 07:03

## 2023-02-26 RX ADMIN — OXYCODONE HYDROCHLORIDE 2.5 MG: 5 TABLET ORAL at 19:58

## 2023-02-26 RX ADMIN — LISINOPRIL 20 MG: 10 TABLET ORAL at 21:04

## 2023-02-26 RX ADMIN — ALBUTEROL SULFATE 2 PUFF: 90 AEROSOL, METERED RESPIRATORY (INHALATION) at 21:04

## 2023-02-26 RX ADMIN — ACETAMINOPHEN 975 MG: 325 TABLET ORAL at 16:07

## 2023-02-26 RX ADMIN — CARVEDILOL 6.25 MG: 6.25 TABLET, FILM COATED ORAL at 08:13

## 2023-02-26 ASSESSMENT — ACTIVITIES OF DAILY LIVING (ADL)
ADLS_ACUITY_SCORE: 44
ADLS_ACUITY_SCORE: 44
ADLS_ACUITY_SCORE: 46
ADLS_ACUITY_SCORE: 44
ADLS_ACUITY_SCORE: 44
ADLS_ACUITY_SCORE: 45
ADLS_ACUITY_SCORE: 45
ADLS_ACUITY_SCORE: 47
ADLS_ACUITY_SCORE: 45
ADLS_ACUITY_SCORE: 48
ADLS_ACUITY_SCORE: 44
ADLS_ACUITY_SCORE: 45

## 2023-02-26 NOTE — PROVIDER NOTIFICATION
HTN. See flowsheet. When BP first elevated patient reported that she will refuse to take anything for it even if provider prescribed it. Came down a touch with nurse sitting with patient. Rechecked again and elevated more and patient was agreeable. Provider was notified of elevation and that patient not agreeable to taking anything and when she was agreeable. Orders for PRN hydralazine.

## 2023-02-26 NOTE — PROGRESS NOTES
Care Management Follow Up    Length of Stay (days): 3    Expected Discharge Date: 02/26/2023     Concerns to be Addressed: discharge planning       Patient plan of care discussed at interdisciplinary rounds: Yes    Anticipated Discharge Disposition: Assisted Living, Home Care  Disposition Comments: is seen by the rounder at the [facility]    Anticipated Discharge Services:  Continue with [facility] PT, OT, and SW services    Anticipated Discharge DME:  None     Patient/family educated on Medicare website which has current facility and service quality ratings: N/A     Education Provided on the Discharge Plan: yes      Patient/Family in Agreement with the Plan: yes    Referrals Placed by CM/SW: External Care Coordination    Private pay costs discussed: transportation costs    Additional Information:  It has been indicated that patient may be ready for hospital discharge today.  Paz Terry, is not available for transport today.  Writer has called [employer] and [employer] and left messages asking if they are doing any transports today.  Awaiting calls back.    [employer] and [employer] are not doing any transports on Sundays. [employer] and [employer] only run Monday-Friday.     1325- Writer has not received any calls back from the transport companies.  Writer has tried calling them again as well, with no answer.  Writer has called and spoken with dougcristi Paz.  She is not available today due to her son's basketball tournament.  Discussed potential of not finding a transport company and then possible discharge for tomorrow.  Paz stated she would be available for transport tomorrow morning between 0900 and 0930.  Discussed that the provider starts shift at 0900 and could have orders ready by 0930.  Discussed that patient is really wanting to be back at the [facility] tomorrow by 1000, as her  [facility] about [facility] that she wants to be there for.  Paz  "requesting more information from staff on how patient is doing with her pivot transfers.  Did review information from the PT note from today stating patient is at baseline with her pivot transfers.  Writer will be calling Paz later to discuss if agency transport was found for today or not.      1506- Care Management has received no calls back regarding agency transportation availability for today.  Writer has spoken with the provider and they are aware.  Plan is for patient to stay the night.  Writer has spoken with martin Paz.  She will come tomorrow morning to transport patient back to [facility].  Patient is aware.     Bedside nurse is calling [facility] nurse, Sandra, to inform her of the discharge plan for tomorrow.      1617- Writer visited with patient to review the discharge plan for tomorrow morning. Also, writer attempted to review the IMM letter for discharge with patient, but patient unable to focus and continued with many stories.  IMM form will need to be reviewed with patient and niece when nicristi arrives tomorrow morning.  Patient also asking if she can see \"an actually MD tomorrow and not a nurse practitioner\".  Writer passed this on to the bedside nurse.      Zabrina Flores SOHAM  [facility]   [phone]     "

## 2023-02-26 NOTE — PHARMACY-ANTICOAGULATION SERVICE
Clinical Pharmacy - Warfarin Dosing Consult     Pharmacy has been consulted to manage this patient s warfarin therapy.  Indication: Atrial Fibrillation  Therapy Goal: INR 2-3  OP Anticoag Clinic: [facility] vs [facility] (patient's shelter)  Warfarin Prior to Admission: Yes  Warfarin PTA Regimen: 2.5 mg Monday and Friday, and 3.75 mg all other days  Significant drug interactions: Azithromycin  Recent documented change in oral intake/nutrition: Unknown    INR   Date Value Ref Range Status   02/26/2023 2.93 (H) 0.85 - 1.15 Final   02/25/2023 4.28 (H) 0.85 - 1.15 Final       Recommend warfarin 4 mg today.  Pharmacy will monitor Radha Hennessy daily and order warfarin doses to achieve specified goal.      Please contact pharmacy as soon as possible if the warfarin needs to be held for a procedure or if the warfarin goals change.

## 2023-02-26 NOTE — PLAN OF CARE
Goal Outcome Evaluation:      Plan of Care Reviewed With: patient    Overall Patient Progress: improvingOverall Patient Progress: improving    Outcome Evaluation: Pt A&Ox4. VSS on RA(as of this afternoon), other than hypertension. LS continue with fine crackles in bases. Verbalizes some anxiety with current living situation and finances. Scheduled tylenol given. Lidocaine patch x2 applied around 1700 this evening on the right hip and knee, will be removed around 2100 to get on a daytime schedule per pt request. Bengay cream applied to shoulders. Incont of urine. BM x1. Ambulated with sarasteady. IV rocephin continued, switched to PO zithromax.

## 2023-02-26 NOTE — PLAN OF CARE
Problem: Plan of Care - These are the overarching goals to be used throughout the patient stay.    Goal: Plan of Care Review  Description: The Plan of Care Review/Shift note should be completed every shift.  The Outcome Evaluation is a brief statement about your assessment that the patient is improving, declining, or no change.  This information will be displayed automatically on your shift note.  Outcome: Progressing  Flowsheets (Taken 2/26/2023 1041)  Outcome Evaluation: Pt. A&Ox4. HTN with PRN hydralazine given at 0155 at 0703 for SBP >180. Scheduled Tylenol for pain. Refused anything stronger. Bengay to feet. Lidocaine patches removed at 2100 and had to explain rationale for early removal. Patient felt as though that was a waste and it didn't make since. Complained of SOB throughout shift. Provider notified. Lung sound changed with upper lobes becoming more coarse. Use of abd muscles. Incont of urine x2 small. Scale in room for measuring future incont. Sat on side of bed for meds or drinking anything as patient can't sit up tall enough in the bed and coughing noted after drinking in bed in the beginning of the shift. Patient anxious and reporting that she wanted to go to [facility] where they have her records and very anxious about being discharged at all as she feels as though the facility that she lives in is not appropriate for her level of care. Reassurance throughout shift. Writing nurse sat with patient helping calm which helped with SOB. Repositioned several times throughout shift.  Plan of Care Reviewed With: patient  Overall Patient Progress: no change

## 2023-02-26 NOTE — PROGRESS NOTES
[facility]    Medicine Progress Note - Hospitalist Service    Date of Admission:  2/23/2023    Assessment & Plan      Radha Hennessy is a 89 year old female with PMH; SSS, PPM, PAF, DJD, Chronic AC, SBO, Anxiety, Recurrent UTI. Presented to the ED at [facility] with CC vomiting and diarrhea and she was subsequently admitted to the hospital for acute hypoxic respiratory failure secondary to bilateral pneumonia.      # Acute hypoxic respiratory failure. O2 sat 89% on RA. Secondary to bilateral pneumonia    Has been able to wean off supplemental oxygen is able to maintain greater than 90% on room air.    Initially on Rocephin and Zithromax.  Finished course of azithromycin on 2/26 and switched to oral Omnicef 300 mg daily on 2/26.  Patient should continue at discharge for additional 6 more days    Continues to have a component of anxiety related to hypoxia for which Atarax was initiated     # Acute bilateral CAP-POA    ABX see above    Supplemental O2 titrate/wean ASAP    Nebs PRN    BC x2 at 12h-no growth to date 2/26    Stop IVF      # SIRS POA  Fever 101.4 In ED     Initial hypoxia 89% RA, now able to tolerate greater than 90% on room air    Tachypnea 20/min    She was not tachycardic, however, she takes beta blocker likely would not develop tachycardia    T-Max 99.0          # Intermittent atrial fib.-chronic    Rate controlled    Continue rate controlling meds     # Chronic anticoagulation  Pharmacy managing    Goal INR 2-3 for a-fib     INR 4.28 Coumadin on hold, recheck in a.m.    Pharmacy dosing and will provide recommendations prior to discharge     # HTN:     Continue antihypertensives     # Hypomagnesemia    Nurse driven replacement protocol      # DJD: Polyarticular knees, feet, elbows, hands    Conservative management, scheduled tylenol and Lidocaine patch    C/O Right hip pain. No reports of fall. X-ray hip/pelvis      # Frail Elderly:     Lives in [place] with  spouse that has medical issues    DJD and osteopenia contribute to fraility.    PT/OT evaluations    Continued PT for right lower extremity impairment    Anticipate return to her correction     # [place]:     Interferes with communication     # Anemia: Normocytic/normochromic      Had 3 gm drop in Hgb from admission. 12.1> 9.2--> 9.1    Last 3 visits to ED she has had mild anemia     She is not symptomatic    Check stools for occult blood     Monitor. Transfuse if hgb < 7.0 and or symptomatic         Diet: Combination Diet Regular Diet Adult    DVT Prophylaxis: Warfarin  Godinez Catheter: Not present  Lines: None     Cardiac Monitoring: ACTIVE order. Indication: SIRS  Code Status: Full Code      Clinically Significant Risk Factors        # Hypokalemia: Lowest K = 3.1 mmol/L in last 2 days, will replace as needed                          Disposition Plan      Expected Discharge Date: 02/27/2023,  9:00 AM  Discharge Delays: Other (Add Comment)  Destination: assisted living          The patient's care was discussed with the Attending Physician, Dr. Rea, Bedside Nurse and Care Coordinator/.    Reji Pichardo NP  Hospitalist Service  [facility]  Securely message with Fooducate (more info)  Text page via [facility] Paging/Directory   ______________________________________________________________________    Interval History    11/26 no acute events overnight.  Patient continues to be anxious about her living situation and adamant that she wants to return to [place] and to her primary residence.  Discussion with patient's great niece/DPOA was had and she understands patient's feelings but understands that patient has to be in a safe environment.  Tentative plan to discharge in the a.m. of 2/27 with family coming to transport.    2/25 Reviewed overnight notes. No significant events patient states that she is apprehensive of returning to her living situation and that she would like her niece to  find her and her  alternative living situations.  She continues to be upset about having to leave her home and dislikes her current facility.  A call was placed to her niece, Paz, to inform her of her aunts condition but there was no answer at her phone.      Physical Exam   Vital Signs: Temp: 99.1  F (37.3  C) Temp src: Oral BP: (!) 179/75 Pulse: 63   Resp: 20 SpO2: 94 % O2 Device: None (Room air) Oxygen Delivery: 1 LPM  Weight: 128 lbs 4.92 oz    Constitutional: 89 yo female in bed. She is currently on room air and is not in any distress  Eyes: sclera clear and conjunctiva normal  Hematologic / Lymphatic: No active bleeding or bruising   Respiratory: BS diminished bilaterally CTA, no wheezing, rales or ronchi  Cardiovascular: Normal apical impulse, regular rate and rhythm, normal S1 and S2, pacemaker in place  GI: normal bowel sounds, soft, non-distended and non-tender  Skin: no bruising or bleeding, normal skin color, texture, turgor, no pallor   Musculoskeletal: Palpation of the right hip-non-tender  Neurologic: Alert and oriented. Conversation is appropriate. She is very [place]     Medical Decision Making       40 MINUTES SPENT BY ME on the date of service doing chart review, history, exam, documentation & further activities per the note.      Data     I have personally reviewed the following data over the past 24 hrs:    N/A  \   N/A   / N/A     N/A N/A N/A /  N/A   3.8 N/A N/A \       INR:  2.93 (H) PTT:  N/A   D-dimer:  N/A Fibrinogen:  N/A       Imaging results reviewed over the past 24 hrs:   No results found for this or any previous visit (from the past 24 hour(s)).

## 2023-02-26 NOTE — PROGRESS NOTES
Re-assessed BP as previous was elevated above 180's. Patient was talking, moving, and is difficult to obtain accurate reading. Will continue to monitor. SW currently with patient now.

## 2023-02-26 NOTE — PLAN OF CARE
Goal Outcome Evaluation:           Overall Patient Progress: improvingOverall Patient Progress: improving    Outcome Evaluation: pt A/Ox4, very [place]. pt agreed to take Oxycodone for right hip/knee pain. also receiving scheduled Tylenol & Lidocaine patch. appeared anxious, reported having shortness of breath, O2 sats 93% RA. lungs with fine crackles. up to BR/chair for meals with encouragement. needing Dorothy Steady lift this AM. was able to ambulate to BR with 2A/walker at lunch. will continue with POC.

## 2023-02-26 NOTE — PROVIDER NOTIFICATION
Patient having increase SOB. LS fine crackles bases and now more course upper. Attempted repositioning as patient tended to slide down and curl up in bed. Use of abd muscles. Sats >94% on RA. No new orders. Continue to monitor.

## 2023-02-27 VITALS
HEIGHT: 62 IN | HEART RATE: 63 BPM | TEMPERATURE: 98.4 F | SYSTOLIC BLOOD PRESSURE: 169 MMHG | WEIGHT: 128.31 LBS | BODY MASS INDEX: 23.61 KG/M2 | DIASTOLIC BLOOD PRESSURE: 73 MMHG | RESPIRATION RATE: 24 BRPM | OXYGEN SATURATION: 90 %

## 2023-02-27 LAB
HOLD SPECIMEN: NORMAL
INR PPP: 2.52 (ref 0.85–1.15)
MAGNESIUM SERPL-MCNC: 1.8 MG/DL (ref 1.7–2.3)
POTASSIUM SERPL-SCNC: 3.7 MMOL/L (ref 3.4–5.3)

## 2023-02-27 PROCEDURE — 84132 ASSAY OF SERUM POTASSIUM: CPT | Performed by: NURSE PRACTITIONER

## 2023-02-27 PROCEDURE — 83735 ASSAY OF MAGNESIUM: CPT | Performed by: NURSE PRACTITIONER

## 2023-02-27 PROCEDURE — 250N000013 HC RX MED GY IP 250 OP 250 PS 637: Performed by: FAMILY MEDICINE

## 2023-02-27 PROCEDURE — 36415 COLL VENOUS BLD VENIPUNCTURE: CPT | Performed by: NURSE PRACTITIONER

## 2023-02-27 PROCEDURE — 250N000011 HC RX IP 250 OP 636: Performed by: INTERNAL MEDICINE

## 2023-02-27 PROCEDURE — 250N000013 HC RX MED GY IP 250 OP 250 PS 637: Performed by: NURSE PRACTITIONER

## 2023-02-27 PROCEDURE — 99239 HOSP IP/OBS DSCHRG MGMT >30: CPT | Performed by: NURSE PRACTITIONER

## 2023-02-27 PROCEDURE — 85610 PROTHROMBIN TIME: CPT | Performed by: NURSE PRACTITIONER

## 2023-02-27 RX ORDER — WARFARIN SODIUM 2.5 MG/1
2.5 TABLET ORAL
Status: DISCONTINUED | OUTPATIENT
Start: 2023-02-27 | End: 2023-02-27 | Stop reason: HOSPADM

## 2023-02-27 RX ORDER — POTASSIUM CHLORIDE 1500 MG/1
20 TABLET, EXTENDED RELEASE ORAL ONCE
Status: COMPLETED | OUTPATIENT
Start: 2023-02-27 | End: 2023-02-27

## 2023-02-27 RX ADMIN — CEFDINIR 300 MG: 300 CAPSULE ORAL at 09:24

## 2023-02-27 RX ADMIN — CARVEDILOL 6.25 MG: 6.25 TABLET, FILM COATED ORAL at 07:51

## 2023-02-27 RX ADMIN — LISINOPRIL 20 MG: 10 TABLET ORAL at 09:24

## 2023-02-27 RX ADMIN — HYDRALAZINE HYDROCHLORIDE 5 MG: 20 INJECTION INTRAMUSCULAR; INTRAVENOUS at 00:12

## 2023-02-27 RX ADMIN — POTASSIUM CHLORIDE 20 MEQ: 1500 TABLET, EXTENDED RELEASE ORAL at 07:48

## 2023-02-27 RX ADMIN — LIDOCAINE 2 PATCH: 560 PATCH PERCUTANEOUS; TOPICAL; TRANSDERMAL at 07:56

## 2023-02-27 RX ADMIN — HYDRALAZINE HYDROCHLORIDE 5 MG: 20 INJECTION INTRAMUSCULAR; INTRAVENOUS at 04:25

## 2023-02-27 RX ADMIN — ACETAMINOPHEN 975 MG: 325 TABLET ORAL at 07:53

## 2023-02-27 RX ADMIN — ALBUTEROL SULFATE 2 PUFF: 90 AEROSOL, METERED RESPIRATORY (INHALATION) at 09:33

## 2023-02-27 RX ADMIN — HYDROXYZINE HYDROCHLORIDE 25 MG: 25 TABLET ORAL at 00:14

## 2023-02-27 ASSESSMENT — ACTIVITIES OF DAILY LIVING (ADL)
ADLS_ACUITY_SCORE: 48
ADLS_ACUITY_SCORE: 48
ADLS_ACUITY_SCORE: 47
ADLS_ACUITY_SCORE: 48

## 2023-02-27 NOTE — PLAN OF CARE
Physical Therapy Discharge Summary    Reason for therapy discharge:    Discharged to home with home therapy.    Progress towards therapy goal(s). See goals on Care Plan in [facility] electronic health record for goal details.  Goals partially met.  Barriers to achieving goals:   discharge from facility.    Therapy recommendation(s):    Continued therapy is recommended.  Rationale/Recommendations:  to address RLE pain and weakness to improve transfers and safety.      Thank you for your referral.  Danya Martin, PT, DPT, ATC, LAT    [facility]  O: [phone]  E: [email or web address]

## 2023-02-27 NOTE — PLAN OF CARE
Problem: Plan of Care - These are the overarching goals to be used throughout the patient stay.    Goal: Plan of Care Review  Description: The Plan of Care Review/Shift note should be completed every shift.  The Outcome Evaluation is a brief statement about your assessment that the patient is improving, declining, or no change.  This information will be displayed automatically on your shift note.  Outcome: Progressing  Flowsheets (Taken 2/27/2023 0752)  Outcome Evaluation: Pt. A&Ox4. [place] and uses hearing aid. Pain managed with scheduled tylenol and patient agreed to taking PRN oxycodone x1. PRN atarax x1. Patient slept through night. No complaints of SOB. HTN with PRN hydralazine every 4 hours and BP only dropping a little. Assymptomatic. IV bleeding this AM so dressing changed. Up to side of bed for oral pills. Restless legs and Bengay applied to bilateral legs with little releif. Patient sleeping at 0600 and refused to take tylenol. Reported that she wanted to keep sleeping. Patient slept through night, but arousable for cares.  Plan of Care Reviewed With: patient  Overall Patient Progress: improving

## 2023-02-27 NOTE — PHARMACY-ANTICOAGULATION SERVICE
Clinical Pharmacy - Warfarin Dosing Consult     Pharmacy has been consulted to manage this patient s warfarin therapy.  Indication: Atrial Fibrillation  Therapy Goal: INR 2-3  OP Anticoag Clinic: [facility] vs [facility] (patient's [facility])  Warfarin Prior to Admission: Yes  Warfarin PTA Regimen: 2.5 mg Monday and Friday, and 3.75 mg all other days  Significant drug interactions: Azithromycin  Recent documented change in oral intake/nutrition: Unknown    INR   Date Value Ref Range Status   02/27/2023 2.52 (H) 0.85 - 1.15 Final   02/26/2023 2.93 (H) 0.85 - 1.15 Final       Recommend warfarin 2.5 mg today.  Pharmacy will monitor Radha Brayon daily and order warfarin doses to achieve specified goal.      Please contact pharmacy as soon as possible if the warfarin needs to be held for a procedure or if the warfarin goals change.

## 2023-02-27 NOTE — PROGRESS NOTES
Care Management Discharge Note    Discharge Date: 02/27/2023    Discharge Disposition: Assisted Living - [facility]     Discharge Services:  Resume [facility] - PT/OT, SW    Discharge DME:      Discharge Transportation: family    Private pay costs discussed: Not applicable    PAS Confirmation Code:  N/A    Patient/family educated on Medicare website which has current facility and service quality ratings:      Education Provided on the Discharge Plan:    Persons Notified of Discharge Plans: Patient.Niece    Patient/Family in Agreement with the Plan: yes    Handoff Referral Completed: Yes    Additional Information:  Patient discharging back to [facility] today.  Patient will resume [facility] services onsite for PT/OT and SW.    Sandra, at [facility], aware of discharge for today.    Paz Terry, to transport at 0930.    DANTE Holloway  [facility] [phone]/ [facility] [phone]  Care Management

## 2023-02-27 NOTE — DISCHARGE INSTRUCTIONS
Hospital follow up appointment:  Monday March 6th at 11:00 check in  Lisa North NP  [facility]  [phone]

## 2023-02-27 NOTE — PLAN OF CARE
Goal Outcome Evaluation:      Plan of Care Reviewed With: patient, family    Overall Patient Progress: improvingOverall Patient Progress: improving    Outcome Evaluation: Clear lung sounds. Afebrile. Rare cough. Lidocaine patches and Tylenol helping with right knee and hip pain. Had BM and incontinenet of urine. Ate scherer for breakfast and refused offer of other foods. BP was 182/77 this morning and was given Coreg and Lisinopril. BP came down to 169/73. Not happy about returning to [facility] stating she needed more help. Was told that additional help can usually be secured by adding on fees for more services. Magnesium was 1.8 and potassium was 3.7 which was replaced orally. Discharging back to [facility] with niece transporting patient. Will continue on Omnicef.    S-(situation): Patient discharged to [facility] via wheelchair to private vehicle with niece.    B-(background): Pneumonia    A-(assessment): as above    R-(recommendations): Discharge instructions reviewed with patient and niece Listed belongings gathered and returned to patient. Will follow up Monday and [facility].        Discharge Nursing Criteria:     Care Plan and Patient education resolved: Yes    New Medications- pt has been educated about purpose and side effects: Yes    Vaccines  Influenza status verified at discharge:  Yes      MISC  Prescriptions if needed, hard copies sent with patient  Yes  Home medications returned to patient: NA  Medication Bin checked and emptied on discharge Yes  Patient reports post-discharge pain management plan is effective: Yes

## 2023-02-27 NOTE — DISCHARGE SUMMARY
"[facility]  Hospitalist Discharge Summary      Date of Admission:  2/23/2023  Date of Discharge:  2/27/2023  Discharging Provider: Reji Pichardo NP  Discharge Service: Hospitalist Service    Discharge Diagnoses   Community-acquired pneumonia    Follow-ups Needed After Discharge   Follow-up Appointments     Follow-up and recommended labs and tests       Follow up with primary care provider, Physician No Ref-Primary, within 7   days for hospital follow- up.  The following labs/tests are recommended:   HGB             Unresulted Labs Ordered in the Past 30 Days of this Admission     Date and Time Order Name Status Description    2/23/2023  2:17 PM Blood Culture Arm, Left Preliminary     2/23/2023  2:17 PM Blood Culture Peripheral Blood Preliminary       These results will be followed up by PCP    Discharge Disposition   Discharged to assisted living  Condition at discharge: Stable    Hospital Course   Radha Hennessy is a 89 year old female with PMH; SSS, PPM, PAF, DJD, Chronic AC, SBO, Anxiety, Recurrent UTI. Presented to the ED at [facility] with CC vomiting and diarrhea and she was subsequently admitted to the hospital for acute hypoxic respiratory failure secondary to bilateral pneumonia.  Initially she was requiring 6 L of oxygen within the ED and has been since able to titrate down to room air and maintain greater than 90% oxygenation.  She was started on Rocephin and azithromycin for which she completed the full course of azithromycin and her Rocephin was converted to oral antibiotic Omnicef.  She will take an additional 6 days of Omnicef upon discharge.  During this hospitalization she was overly fixated on her living conditions and stated that her facility did not provide enough care for her.  This was brought up with family/DPOA and these needs are planning to be addressed.  Patient states that she can \"no longer care for herself \"and \"wants someone else to do it " "for her \".  She is being discharged back to her assisted living with her grandniece/DPOA to continue PT/OT.  Hospital course laid out below:    # Acute hypoxic respiratory failure    O2 sat 89% on RA. Secondary to bilateral pneumonia    Has been able to wean off supplemental oxygen is able to maintain greater than 90% on room air.    Initially on Rocephin and Zithromax.  Finished course of azithromycin on 2/26 and switched to oral Omnicef 300 mg daily on 2/26.  Patient should continue at discharge for additional 6 more days    Continues to have a component of anxiety related to hypoxia for which Atarax was initiated     # Acute bilateral CAP-POA    ABX see above    Supplemental O2 titrate/wean ASAP    Nebs PRN    BC x2 at 12h-no growth to date 2/26    Stop IVF      # SIRS POA  Fever 101.4 In ED     Initial hypoxia 89% RA, now able to tolerate greater than 90% on room air    Tachypnea 20/min    She was not tachycardic, however, she takes beta blocker likely would not develop tachycardia    T-Max 99.0          # Intermittent atrial fib.-chronic    Rate controlled    Continue rate controlling meds     # Chronic anticoagulation  Pharmacy managing    Goal INR 2-3 for a-fib     INR 4.28 Coumadin on hold, recheck in a.m.    Pharmacy dosing and will provide recommendations prior to discharge     # HTN:     Continue antihypertensives     # Hypomagnesemia    Nurse driven replacement protocol      # DJD: Polyarticular knees, feet, elbows, hands    Conservative management, scheduled tylenol and Lidocaine patch    C/O Right hip pain. No reports of fall. X-ray hip/pelvis      # Frail Elderly:     Lives in nursing home with spouse that has medical issues    DJD and osteopenia contribute to fraility.    PT/OT evaluations    Continued PT for right lower extremity impairment    Anticipate return to her nursing home     # [place]:     Interferes with communication     # Anemia: Normocytic/normochromic      Had 3 gm drop in Hgb from admission. 12.1> 9.2--> " 9.1--> 9.9-improving and suspect related to dilution    Last 3 visits to ED she has had mild anemia     She is not symptomatic    Check stools for occult blood     Monitor. Transfuse if hgb < 7.0 and or symptomatic          Consultations This Hospital Stay   CARE MANAGEMENT / SOCIAL WORK IP CONSULT  PHYSICAL THERAPY ADULT IP CONSULT  OCCUPATIONAL THERAPY ADULT IP CONSULT  PHARMACY IP CONSULT  PHARMACY TO DOSE WARFARIN    Code Status   Full Code    Time Spent on this Encounter   I, Reji Pichardo NP, personally saw the patient today and spent greater than 30 minutes discharging this patient.       Reji Pichardo NP  [facility] MEDICAL SURGICAL  [address]   [place] [address]  Phone: [phone]  ______________________________________________________________________    Physical Exam   Vital Signs: Temp: 97.9  F (36.6  C) Temp src: Oral BP: (!) 182/77 Pulse: 61   Resp: 24 SpO2: 90 % O2 Device: None (Room air)    Weight: 128 lbs 4.92 oz  Constitutional: awake, alert, cooperative, no apparent distress, and appears stated age  ENT: normocepalic, without obvious abnormality, atramatic  Respiratory: no increased work of breathing, no retractions and clear to auscultation  Cardiovascular: normal apical pulses  and normal S1 and S2  Musculoskeletal: no lower extremity pitting edema present  Significant chronic kyphosis  Neurologic: Awake, alert, oriented to name, place and time.  Cranial nerves II-XII are grossly intact.  Motor is 5 out of 5 bilaterally.    Neuropsychiatric: General: normal, calm, normal eye contact and fixated on living situation and persistent anger related to not living at home any longer  Affect: normal       Primary Care Physician   Physician No Ref-Primary    Discharge Orders      Reason for your hospital stay    Community acquired pneumonia     Follow-up and recommended labs and tests     Follow up with primary care provider, Physician No Ref-Primary, within 7 days for  hospital follow- up.  The following labs/tests are recommended: HGB     Activity    Your activity upon discharge: activity as tolerated     Resume Home Care Services    Resume [facility]  Services with PT, OT, and social work     Diet    Follow this diet upon discharge: Orders Placed This Encounter      Combination Diet Regular Diet Adult       Significant Results and Procedures   Results for orders placed or performed during the hospital encounter of 02/23/23   XR Chest Port 1 View    Narrative    XR CHEST PORT 1 VIEW   2/23/2023 3:11 PM     HISTORY: fever; shortness of breath    COMPARISON: None.      Impression    IMPRESSION: Cardiac silhouette at the upper limits of normal in size.  Pacemaker leads overlying the right atrium and right ventricle. Patchy  nodular opacities throughout the right lung and in the left lower  lobe, favor pneumonia but recommend radiographic follow-up to  resolution to exclude underlying lesions. No pleural effusion or  pneumothorax. No acute bony abnormality. 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    JAKE COLON MD         SYSTEM ID:  [id number]   XR Pelvis 1/2 Views    Narrative    EXAM: PELVIS ONE TO TWO VIEWS  DATE/TIME: 2/24/2023 1:14 PM    INDICATION: Right hip pain.  COMPARISON: None available.       Impression    IMPRESSION: No acute displaced pelvic or proximal femoral fracture  identified. Diffuse bone demineralization. Moderate bilateral hip  osteoarthritis. Both obturator rings intact. Degenerative change lower  lumbar spine. Arterial calcification.       DEBBIE BROWN MD         SYSTEM ID:  [id number]       Discharge Medications   Current Discharge Medication List      START taking these medications    Details   cefdinir (OMNICEF) 300 MG capsule Take 1 capsule (300 mg) by mouth daily for 6 days  Qty: 6 capsule, Refills: 0    Associated Diagnoses: Community acquired pneumonia, unspecified laterality         CONTINUE these medications which have NOT CHANGED    Details    acetaminophen (TYLENOL) 650 MG CR tablet Take 1,300 mg by mouth 3 times daily      albuterol (PROAIR HFA/PROVENTIL HFA/VENTOLIN HFA) 108 (90 Base) MCG/ACT inhaler Inhale 2 puffs into the lungs 2 times daily Additional Three times daily as needed      calcium carbonate antacid 1000 MG CHEW Take 1,000 mg by mouth      carvedilol (COREG) 6.25 MG tablet Take 6.25 mg by mouth 2 times daily (with meals)      cholecalciferol (VITAMIN D3) 25 mcg (1000 units) capsule Take 1 capsule by mouth every evening      Diaper Rash Products (A+D DIAPER RASH) CREA Apply topically as needed      HYDROcodone-acetaminophen (NORCO) 5-325 MG tablet Take 1 tablet by mouth nightly as needed for severe pain (7-10)  Qty: 12 tablet, Refills: 0      ketoconazole (NIZORAL) 2 % external cream Apply topically as needed      Lidocaine (LIDOCARE) 4 % Patch Place 1 patch onto the skin every 24 hours To prevent lidocaine toxicity, patient should be patch free for 12 hrs daily.  Qty: 15 patch, Refills: 0    Associated Diagnoses: Right knee pain, unspecified chronicity      lisinopril (ZESTRIL) 20 MG tablet Take 20 mg by mouth 2 times daily      omeprazole (PRILOSEC) 20 MG DR capsule Take 20 mg by mouth At Bedtime      polyethylene glycol (MIRALAX) 17 GM/Dose powder Take 17 g by mouth      senna-docusate (SENOKOT-S/PERICOLACE) 8.6-50 MG tablet Take 2 tablets by mouth      vitamin B-12 (CYANOCOBALAMIN) 1000 MCG tablet Take 1,000 mcg by mouth      !! warfarin ANTICOAGULANT (COUMADIN) 7.5 MG tablet Take 3.75 mg by mouth At Bedtime Sunday, Tuesday, Wednesday, Thursday, Saturday evenings at 2000 hours      lactulose (CHRONULAC) 10 GM/15ML solution Take 30 mLs by mouth as needed for constipation      !! warfarin ANTICOAGULANT (COUMADIN) 2.5 MG tablet Take 2.5 mg by mouth At Bedtime Monday & Friday evenings at 2000 hours       !! - Potential duplicate medications found. Please discuss with provider.        Allergies   Allergies   Allergen Reactions      "Cefuroxime Diarrhea     Hydrochlorothiazide Other (See Comments)     hyponatremia     Nitrofurantoin Other (See Comments)     Dizzy, headache, \"sick all over\"     Oxybutynin Other (See Comments)     Dizziness ,lightheaded, urine retention on 10 mg dose     Sulfa Drugs      Amoxicillin Other (See Comments)     Diarrhea     "

## 2023-02-27 NOTE — PROGRESS NOTES
Patient given PRN dose of Atarax. Tele dc'd. Sat up at edge of bed for dinner refused to get up into the chair.

## 2023-02-28 LAB
BACTERIA BLD CULT: NO GROWTH
BACTERIA BLD CULT: NO GROWTH

## 2023-06-15 ENCOUNTER — APPOINTMENT (OUTPATIENT)
Dept: GENERAL RADIOLOGY | Facility: CLINIC | Age: 88
End: 2023-06-15
Attending: EMERGENCY MEDICINE
Payer: MEDICARE

## 2023-06-15 ENCOUNTER — HOSPITAL ENCOUNTER (EMERGENCY)
Facility: CLINIC | Age: 88
Discharge: HOME | End: 2023-06-15
Attending: EMERGENCY MEDICINE | Admitting: EMERGENCY MEDICINE
Payer: MEDICARE

## 2023-06-15 VITALS
WEIGHT: 126.1 LBS | DIASTOLIC BLOOD PRESSURE: 113 MMHG | HEART RATE: 67 BPM | OXYGEN SATURATION: 90 % | SYSTOLIC BLOOD PRESSURE: 148 MMHG | RESPIRATION RATE: 18 BRPM | BODY MASS INDEX: 23.06 KG/M2

## 2023-06-15 DIAGNOSIS — M17.11 OSTEOARTHRITIS OF RIGHT KNEE, UNSPECIFIED OSTEOARTHRITIS TYPE: ICD-10-CM

## 2023-06-15 DIAGNOSIS — M25.461 EFFUSION OF RIGHT KNEE: ICD-10-CM

## 2023-06-15 LAB
HOLD SPECIMEN: NORMAL
HOLD SPECIMEN: NORMAL
INR PPP: 6.83 (ref 0.85–1.15)

## 2023-06-15 PROCEDURE — 36415 COLL VENOUS BLD VENIPUNCTURE: CPT | Performed by: EMERGENCY MEDICINE

## 2023-06-15 PROCEDURE — 99284 EMERGENCY DEPT VISIT MOD MDM: CPT | Performed by: EMERGENCY MEDICINE

## 2023-06-15 PROCEDURE — 85610 PROTHROMBIN TIME: CPT | Performed by: EMERGENCY MEDICINE

## 2023-06-15 PROCEDURE — 73562 X-RAY EXAM OF KNEE 3: CPT | Mod: RT

## 2023-06-15 RX ORDER — TRIAMCINOLONE ACETONIDE 40 MG/ML
40 INJECTION, SUSPENSION INTRA-ARTICULAR; INTRAMUSCULAR ONCE
Status: DISCONTINUED | OUTPATIENT
Start: 2023-06-15 | End: 2023-06-15

## 2023-06-15 ASSESSMENT — ACTIVITIES OF DAILY LIVING (ADL): ADLS_ACUITY_SCORE: 35

## 2023-06-15 NOTE — DISCHARGE INSTRUCTIONS
You have osteoarthritis of your right knee causing a joint effusion.  This swelling is what is causing your pain.  The fluid can be removed from your knee and a steroid can be injected; this will make your pain feel better.  Unfortunately I was not able to do this tonight because your INR is 6 which makes your bleeding complication risk too high.  Please skip your next dose of Coumadin and then restart at your normal dosage but follow-up closely with your primary care doctor.  In the meantime you can use the Ace wrap as needed for comfort.  You can remove this if it is not making you feel more comfortable.

## 2023-06-15 NOTE — ED PROVIDER NOTES
"    History     chief complaint  HPI  Patient is a 89-year-old female with a history of hypertension degenerative joint disease who is presenting with right knee pain.  Patient states this has been going on for months but worse in the last month.  She has a swollen right knee.  There is been no erythema, warmth, fevers, chills.  She was moaning in pain at her nursing home [facility] so they sent her here.  No trauma.  The pain radiates to her hip and down to her foot.  She has a hard time straightening the leg fully given the amount of swelling.    Review of Systems:  All organ systems below were reviewed and are negative unless indicated in the HPI.    Constitutional  Eyes  ENT  Respiratory  Cardiovascular  Gastrointestinal  Genitourinary  Musculoskeletal  Skin  Neuro    Allergies:  Allergies   Allergen Reactions     Cefuroxime Diarrhea     Hydrochlorothiazide Other (See Comments)     hyponatremia     Nitrofurantoin Other (See Comments)     Dizzy, headache, \"sick all over\"     Oxybutynin Other (See Comments)     Dizziness ,lightheaded, urine retention on 10 mg dose     Sulfa Antibiotics      Amoxicillin Other (See Comments)     Diarrhea       Problem List:    Patient Active Problem List    Diagnosis Date Noted     Hypoxemia 02/23/2023     Priority: Medium     SIRS (systemic inflammatory response syndrome) (H) 02/23/2023     Priority: Medium     Community acquired pneumonia, unspecified laterality 02/23/2023     Priority: Medium     Current use of long term anticoagulation 02/20/2023     Priority: Medium     Right knee pain 02/18/2023     Priority: Medium        Past Medical History:    Past Medical History:   Diagnosis Date     Anxiety      Chronic anticoagulation      DJD (degenerative joint disease) of knee      HTN (hypertension)      Mitral insufficiency      Osteopenia      PAF (paroxysmal atrial fibrillation) (H)      Raynaud's syndrome      Recurrent UTI      SBO (small bowel obstruction) (H)      SSS (sick " sinus syndrome) (H)      Urinary incontinence        Past Surgical History:    Past Surgical History:   Procedure Laterality Date     APPENDECTOMY       AS LAP,LYSIS OF ADHESIONS       CHOLECYSTECTOMY       HYSTERECTOMY       IMPLANT PACEMAKER       STAPEDECTOMY       TONSILLECTOMY         Family History:    Family History   Problem Relation Age of Onset     Colon Cancer Mother      Cerebrovascular Disease Father      Kidney Disease Sister      Breast Cancer Maternal Grandmother        Medications:    acetaminophen (TYLENOL) 650 MG CR tablet  albuterol (PROAIR HFA/PROVENTIL HFA/VENTOLIN HFA) 108 (90 Base) MCG/ACT inhaler  calcium carbonate antacid 1000 MG CHEW  carvedilol (COREG) 6.25 MG tablet  cholecalciferol (VITAMIN D3) 25 mcg (1000 units) capsule  Diaper Rash Products (A+D DIAPER RASH) CREA  HYDROcodone-acetaminophen (NORCO) 5-325 MG tablet  ketoconazole (NIZORAL) 2 % external cream  lactulose (CHRONULAC) 10 GM/15ML solution  Lidocaine (LIDOCARE) 4 % Patch  lisinopril (ZESTRIL) 20 MG tablet  omeprazole (PRILOSEC) 20 MG DR capsule  polyethylene glycol (MIRALAX) 17 GM/Dose powder  senna-docusate (SENOKOT-S/PERICOLACE) 8.6-50 MG tablet  vitamin B-12 (CYANOCOBALAMIN) 1000 MCG tablet  warfarin ANTICOAGULANT (COUMADIN) 2.5 MG tablet  warfarin ANTICOAGULANT (COUMADIN) 7.5 MG tablet          Physical Exam   BP: (!) 212/87  Pulse: 65  Resp: 18  Weight: 57.2 kg (126 lb 1.6 oz)  SpO2: 91 %    Gen: Vital signs reviewed  Eyes: Sclera white, pupils round  ENT: External ears and nares normal  Card: Regular rate and rhythm  Resp: No respiratory distress. Lungs clear to auscultation bilaterally  Abd: Soft, non-distended, non-tender  Extremities: Symmetric distal pulses.  Patient has a large right knee joint effusion.  No erythema, warmth, or significant tenderness.  Range of motion limited only due to the mechanical nature of the large joint effusion without significant pain on range of motion.  Skin: No lacerations  Neuro:  Alert    ED Course        Procedures                  Results for orders placed or performed during the hospital encounter of 06/15/23 (from the past 24 hour(s))   XR Knee Right 3 Views    Narrative    EXAM: XR KNEE RIGHT 3 VIEWS  LOCATION: [facility]  DATE: 6/15/2023    INDICATION: right knee pain questionable duration  COMPARISON: 02/18/2023      Impression    IMPRESSION: No acute fracture or dislocation. Diffuse osteopenia. Moderate degenerative changes. Moderate knee joint effusion. Atherosclerotic calcification.   INR   Result Value Ref Range    INR 6.83 (HH) 0.85 - 1.15   Extra Tube    Narrative    The following orders were created for panel order Extra Tube.  Procedure                               Abnormality         Status                     ---------                               -----------         ------                     Extra Green Top (Lithium...[[id number]]                      Final result               Extra Purple Top Tube[[id number]]                            Final result                 Please view results for these tests on the individual orders.   Extra Green Top (Lithium Heparin) Tube   Result Value Ref Range    Hold Specimen hold    Extra Purple Top Tube   Result Value Ref Range    Hold Specimen hold        Medications - No data to display      Consultations:  None  Social Determinants of Health:  Lives in a nursing home    Assessments & Plan (with Medical Decision Making)       I have reviewed the nursing notes.    I have reviewed the findings, diagnosis, plan and need for follow up with the patient.      Medical Decision Making  On arrival, patient is well-appearing.  She has an elevated blood pressure.  Looking back at her prior notes she frequently runs high.  She was at the [place] emergency department for her hypertension and was diagnosed with asymptomatic hypertension had a reassuring lab work-up and was sent back home.  With her months of right  knee pain and joint effusion, I suspect osteoarthritis and an associated inflammatory joint effusion.  Low concern for gout or septic joint based on exam.  X-ray confirms osteoarthritis and joint effusion.  I did discuss arthrocentesis with removal of fluid and injection of a steroid.  However patient's INR is 6.  I feel that the risk benefit of this procedure is not favorable with her elevated INR given the lack of urgency to the situation.  Instead I wrapped an Ace wrap around her knee.  I did discuss with her that when her INR comes down she can get this procedure with her primary care team.  Told her to skip a dose of Coumadin and follow-up with her weekly INR checks.  Discharged back in stable condition.    Final diagnoses:   Effusion of right knee   Osteoarthritis of right knee, unspecified osteoarthritis type         Robin Oro M.D.   [facility] Emergency Department     Robin Oro MD  06/15/23 0249       Robin Oro MD  06/15/23 0250

## 2023-06-15 NOTE — ED TRIAGE NOTES
RLE hip to knee pain sharp pain (chronic exacerbation) sent from [facility] living. Pt states 4 months ago had same issue, today went to [facility] and c/o leg pain - HTN went to ED 'they never found out what was wrong with my leg'. Bruise noted lateral RLE calf.      Triage Assessment     Row Name 06/15/23 0128       Triage Assessment (Adult)    Airway WDL WDL       Respiratory WDL    Respiratory WDL WDL       Cardiac WDL    Cardiac WDL WDL

## 2024-01-01 ENCOUNTER — MEDICAL CORRESPONDENCE (OUTPATIENT)
Dept: HEALTH INFORMATION MANAGEMENT | Facility: CLINIC | Age: 89
End: 2024-01-01

## 2024-01-01 ENCOUNTER — APPOINTMENT (OUTPATIENT)
Dept: GENERAL RADIOLOGY | Facility: CLINIC | Age: 89
DRG: 177 | End: 2024-01-01
Attending: EMERGENCY MEDICINE
Payer: MEDICARE

## 2024-01-01 ENCOUNTER — HOSPITAL ENCOUNTER (INPATIENT)
Facility: CLINIC | Age: 89
LOS: 1 days | Discharge: HOSPICE | DRG: 177 | End: 2024-05-20
Attending: EMERGENCY MEDICINE | Admitting: INTERNAL MEDICINE
Payer: MEDICARE

## 2024-01-01 VITALS
TEMPERATURE: 98.4 F | SYSTOLIC BLOOD PRESSURE: 135 MMHG | RESPIRATION RATE: 30 BRPM | OXYGEN SATURATION: 96 % | DIASTOLIC BLOOD PRESSURE: 70 MMHG | BODY MASS INDEX: 18.75 KG/M2 | WEIGHT: 102.51 LBS | HEART RATE: 86 BPM

## 2024-01-01 DIAGNOSIS — J69.0 ASPIRATION PNEUMONITIS (H): ICD-10-CM

## 2024-01-01 DIAGNOSIS — I50.9 CONGESTIVE HEART FAILURE, UNSPECIFIED HF CHRONICITY, UNSPECIFIED HEART FAILURE TYPE (H): ICD-10-CM

## 2024-01-01 DIAGNOSIS — R13.10 DYSPHAGIA, UNSPECIFIED TYPE: Primary | ICD-10-CM

## 2024-01-01 LAB
ALBUMIN SERPL BCG-MCNC: 3.7 G/DL (ref 3.5–5.2)
ALBUMIN SERPL BCG-MCNC: 4.2 G/DL (ref 3.5–5.2)
ALP SERPL-CCNC: 59 U/L (ref 40–150)
ALP SERPL-CCNC: 68 U/L (ref 40–150)
ALT SERPL W P-5'-P-CCNC: 13 U/L (ref 0–50)
ALT SERPL W P-5'-P-CCNC: 20 U/L (ref 0–50)
ANION GAP SERPL CALCULATED.3IONS-SCNC: 10 MMOL/L (ref 7–15)
ANION GAP SERPL CALCULATED.3IONS-SCNC: 11 MMOL/L (ref 7–15)
APTT PPP: 39 SECONDS (ref 22–38)
AST SERPL W P-5'-P-CCNC: 25 U/L (ref 0–45)
AST SERPL W P-5'-P-CCNC: 32 U/L (ref 0–45)
BASOPHILS # BLD AUTO: 0 10E3/UL (ref 0–0.2)
BASOPHILS # BLD AUTO: 0.1 10E3/UL (ref 0–0.2)
BASOPHILS NFR BLD AUTO: 1 %
BASOPHILS NFR BLD AUTO: 1 %
BILIRUB SERPL-MCNC: 0.8 MG/DL
BILIRUB SERPL-MCNC: 1.2 MG/DL
BUN SERPL-MCNC: 26.8 MG/DL (ref 8–23)
BUN SERPL-MCNC: 28 MG/DL (ref 8–23)
CALCIUM SERPL-MCNC: 9.1 MG/DL (ref 8.2–9.6)
CALCIUM SERPL-MCNC: 9.4 MG/DL (ref 8.2–9.6)
CHLORIDE SERPL-SCNC: 101 MMOL/L (ref 98–107)
CHLORIDE SERPL-SCNC: 102 MMOL/L (ref 98–107)
CREAT SERPL-MCNC: 0.64 MG/DL (ref 0.51–0.95)
CREAT SERPL-MCNC: 0.71 MG/DL (ref 0.51–0.95)
DEPRECATED HCO3 PLAS-SCNC: 27 MMOL/L (ref 22–29)
DEPRECATED HCO3 PLAS-SCNC: 27 MMOL/L (ref 22–29)
EGFRCR SERPLBLD CKD-EPI 2021: 76 ML/MIN/1.73M2
EGFRCR SERPLBLD CKD-EPI 2021: 79 ML/MIN/1.73M2
EOSINOPHIL # BLD AUTO: 0 10E3/UL (ref 0–0.7)
EOSINOPHIL # BLD AUTO: 0.1 10E3/UL (ref 0–0.7)
EOSINOPHIL NFR BLD AUTO: 0 %
EOSINOPHIL NFR BLD AUTO: 1 %
ERYTHROCYTE [DISTWIDTH] IN BLOOD BY AUTOMATED COUNT: 17.2 % (ref 10–15)
ERYTHROCYTE [DISTWIDTH] IN BLOOD BY AUTOMATED COUNT: 17.2 % (ref 10–15)
GLUCOSE SERPL-MCNC: 107 MG/DL (ref 70–99)
GLUCOSE SERPL-MCNC: 108 MG/DL (ref 70–99)
HCT VFR BLD AUTO: 27.8 % (ref 35–47)
HCT VFR BLD AUTO: 29.2 % (ref 35–47)
HGB BLD-MCNC: 8.2 G/DL (ref 11.7–15.7)
HGB BLD-MCNC: 8.5 G/DL (ref 11.7–15.7)
IMM GRANULOCYTES # BLD: 0 10E3/UL
IMM GRANULOCYTES # BLD: 0 10E3/UL
IMM GRANULOCYTES NFR BLD: 0 %
IMM GRANULOCYTES NFR BLD: 0 %
INR PPP: 2.23 (ref 0.85–1.15)
INR PPP: 2.6 (ref 0.85–1.15)
LYMPHOCYTES # BLD AUTO: 0.7 10E3/UL (ref 0.8–5.3)
LYMPHOCYTES # BLD AUTO: 1 10E3/UL (ref 0.8–5.3)
LYMPHOCYTES NFR BLD AUTO: 10 %
LYMPHOCYTES NFR BLD AUTO: 9 %
MCH RBC QN AUTO: 25.4 PG (ref 26.5–33)
MCH RBC QN AUTO: 25.5 PG (ref 26.5–33)
MCHC RBC AUTO-ENTMCNC: 29.1 G/DL (ref 31.5–36.5)
MCHC RBC AUTO-ENTMCNC: 29.5 G/DL (ref 31.5–36.5)
MCV RBC AUTO: 86 FL (ref 78–100)
MCV RBC AUTO: 88 FL (ref 78–100)
MONOCYTES # BLD AUTO: 1 10E3/UL (ref 0–1.3)
MONOCYTES # BLD AUTO: 1 10E3/UL (ref 0–1.3)
MONOCYTES NFR BLD AUTO: 14 %
MONOCYTES NFR BLD AUTO: 9 %
NEUTROPHILS # BLD AUTO: 5.2 10E3/UL (ref 1.6–8.3)
NEUTROPHILS # BLD AUTO: 8.1 10E3/UL (ref 1.6–8.3)
NEUTROPHILS NFR BLD AUTO: 75 %
NEUTROPHILS NFR BLD AUTO: 80 %
NRBC # BLD AUTO: 0 10E3/UL
NRBC # BLD AUTO: 0 10E3/UL
NRBC BLD AUTO-RTO: 0 /100
NRBC BLD AUTO-RTO: 0 /100
NT-PROBNP SERPL-MCNC: 3303 PG/ML (ref 0–1800)
PLATELET # BLD AUTO: 208 10E3/UL (ref 150–450)
PLATELET # BLD AUTO: 228 10E3/UL (ref 150–450)
POTASSIUM SERPL-SCNC: 3.3 MMOL/L (ref 3.4–5.3)
POTASSIUM SERPL-SCNC: 3.9 MMOL/L (ref 3.4–5.3)
PROT SERPL-MCNC: 5.9 G/DL (ref 6.4–8.3)
PROT SERPL-MCNC: 6.6 G/DL (ref 6.4–8.3)
RBC # BLD AUTO: 3.23 10E6/UL (ref 3.8–5.2)
RBC # BLD AUTO: 3.33 10E6/UL (ref 3.8–5.2)
SODIUM SERPL-SCNC: 139 MMOL/L (ref 135–145)
SODIUM SERPL-SCNC: 139 MMOL/L (ref 135–145)
TROPONIN T SERPL HS-MCNC: 28 NG/L
WBC # BLD AUTO: 10.1 10E3/UL (ref 4–11)
WBC # BLD AUTO: 6.9 10E3/UL (ref 4–11)

## 2024-01-01 PROCEDURE — 99222 1ST HOSP IP/OBS MODERATE 55: CPT | Mod: AI | Performed by: INTERNAL MEDICINE

## 2024-01-01 PROCEDURE — 120N000001 HC R&B MED SURG/OB

## 2024-01-01 PROCEDURE — 99239 HOSP IP/OBS DSCHRG MGMT >30: CPT | Performed by: FAMILY MEDICINE

## 2024-01-01 PROCEDURE — 71045 X-RAY EXAM CHEST 1 VIEW: CPT

## 2024-01-01 PROCEDURE — 85610 PROTHROMBIN TIME: CPT | Performed by: INTERNAL MEDICINE

## 2024-01-01 PROCEDURE — 85730 THROMBOPLASTIN TIME PARTIAL: CPT | Performed by: EMERGENCY MEDICINE

## 2024-01-01 PROCEDURE — 94640 AIRWAY INHALATION TREATMENT: CPT

## 2024-01-01 PROCEDURE — 250N000013 HC RX MED GY IP 250 OP 250 PS 637: Performed by: INTERNAL MEDICINE

## 2024-01-01 PROCEDURE — 250N000011 HC RX IP 250 OP 636: Performed by: EMERGENCY MEDICINE

## 2024-01-01 PROCEDURE — 85025 COMPLETE CBC W/AUTO DIFF WBC: CPT | Performed by: INTERNAL MEDICINE

## 2024-01-01 PROCEDURE — 250N000009 HC RX 250: Performed by: INTERNAL MEDICINE

## 2024-01-01 PROCEDURE — 999N000104 HC STATISTIC NO CHARGE

## 2024-01-01 PROCEDURE — 85025 COMPLETE CBC W/AUTO DIFF WBC: CPT | Performed by: EMERGENCY MEDICINE

## 2024-01-01 PROCEDURE — 84484 ASSAY OF TROPONIN QUANT: CPT | Performed by: EMERGENCY MEDICINE

## 2024-01-01 PROCEDURE — 83880 ASSAY OF NATRIURETIC PEPTIDE: CPT | Performed by: EMERGENCY MEDICINE

## 2024-01-01 PROCEDURE — 36415 COLL VENOUS BLD VENIPUNCTURE: CPT | Performed by: INTERNAL MEDICINE

## 2024-01-01 PROCEDURE — 82040 ASSAY OF SERUM ALBUMIN: CPT | Performed by: EMERGENCY MEDICINE

## 2024-01-01 PROCEDURE — 80053 COMPREHEN METABOLIC PANEL: CPT | Performed by: INTERNAL MEDICINE

## 2024-01-01 PROCEDURE — 250N000013 HC RX MED GY IP 250 OP 250 PS 637: Performed by: EMERGENCY MEDICINE

## 2024-01-01 PROCEDURE — 999N000157 HC STATISTIC RCP TIME EA 10 MIN

## 2024-01-01 PROCEDURE — 36415 COLL VENOUS BLD VENIPUNCTURE: CPT | Performed by: EMERGENCY MEDICINE

## 2024-01-01 PROCEDURE — 99285 EMERGENCY DEPT VISIT HI MDM: CPT | Mod: 25 | Performed by: EMERGENCY MEDICINE

## 2024-01-01 PROCEDURE — 85610 PROTHROMBIN TIME: CPT | Performed by: EMERGENCY MEDICINE

## 2024-01-01 PROCEDURE — 99285 EMERGENCY DEPT VISIT HI MDM: CPT | Mod: 25

## 2024-01-01 PROCEDURE — 250N000009 HC RX 250: Performed by: EMERGENCY MEDICINE

## 2024-01-01 RX ORDER — CARVEDILOL 12.5 MG/1
12.5 TABLET ORAL 2 TIMES DAILY WITH MEALS
COMMUNITY
Start: 2024-01-01

## 2024-01-01 RX ORDER — ISOSORBIDE MONONITRATE 30 MG/1
15 TABLET, EXTENDED RELEASE ORAL
COMMUNITY
Start: 2023-06-27

## 2024-01-01 RX ORDER — IPRATROPIUM BROMIDE AND ALBUTEROL SULFATE 2.5; .5 MG/3ML; MG/3ML
1 SOLUTION RESPIRATORY (INHALATION) 2 TIMES DAILY
COMMUNITY
Start: 2024-01-01

## 2024-01-01 RX ORDER — DULOXETIN HYDROCHLORIDE 30 MG/1
30 CAPSULE, DELAYED RELEASE ORAL
COMMUNITY
Start: 2024-01-01

## 2024-01-01 RX ORDER — IPRATROPIUM BROMIDE AND ALBUTEROL SULFATE 2.5; .5 MG/3ML; MG/3ML
1 SOLUTION RESPIRATORY (INHALATION)
COMMUNITY

## 2024-01-01 RX ORDER — AZITHROMYCIN 250 MG/1
250 TABLET, FILM COATED ORAL DAILY
Status: DISCONTINUED | OUTPATIENT
Start: 2024-01-01 | End: 2024-01-01 | Stop reason: HOSPADM

## 2024-01-01 RX ORDER — FUROSEMIDE 10 MG/ML
40 INJECTION INTRAMUSCULAR; INTRAVENOUS ONCE
Status: COMPLETED | OUTPATIENT
Start: 2024-01-01 | End: 2024-01-01

## 2024-01-01 RX ORDER — AMOXICILLIN 250 MG
1 CAPSULE ORAL 2 TIMES DAILY PRN
Status: DISCONTINUED | OUTPATIENT
Start: 2024-01-01 | End: 2024-01-01 | Stop reason: HOSPADM

## 2024-01-01 RX ORDER — WARFARIN SODIUM 2 MG/1
2 TABLET ORAL
COMMUNITY
Start: 2024-01-01

## 2024-01-01 RX ORDER — AMOXICILLIN 250 MG
2 CAPSULE ORAL
COMMUNITY

## 2024-01-01 RX ORDER — IPRATROPIUM BROMIDE AND ALBUTEROL SULFATE 2.5; .5 MG/3ML; MG/3ML
1 SOLUTION RESPIRATORY (INHALATION)
Status: DISCONTINUED | OUTPATIENT
Start: 2024-01-01 | End: 2024-01-01 | Stop reason: HOSPADM

## 2024-01-01 RX ORDER — ONDANSETRON 2 MG/ML
4 INJECTION INTRAMUSCULAR; INTRAVENOUS EVERY 6 HOURS PRN
Status: DISCONTINUED | OUTPATIENT
Start: 2024-01-01 | End: 2024-01-01 | Stop reason: HOSPADM

## 2024-01-01 RX ORDER — AZITHROMYCIN 250 MG/1
250 TABLET, FILM COATED ORAL DAILY
COMMUNITY
Start: 2024-01-01 | End: 2024-01-01

## 2024-01-01 RX ORDER — DULOXETIN HYDROCHLORIDE 30 MG/1
30 CAPSULE, DELAYED RELEASE ORAL
Status: DISCONTINUED | OUTPATIENT
Start: 2024-01-01 | End: 2024-01-01 | Stop reason: HOSPADM

## 2024-01-01 RX ORDER — WARFARIN SODIUM 3 MG/1
3 TABLET ORAL
COMMUNITY
Start: 2024-01-01

## 2024-01-01 RX ORDER — PANTOPRAZOLE SODIUM 40 MG/1
40 TABLET, DELAYED RELEASE ORAL 2 TIMES DAILY
Status: DISCONTINUED | OUTPATIENT
Start: 2024-01-01 | End: 2024-01-01 | Stop reason: HOSPADM

## 2024-01-01 RX ORDER — AMOXICILLIN 250 MG
2 CAPSULE ORAL 2 TIMES DAILY PRN
Status: DISCONTINUED | OUTPATIENT
Start: 2024-01-01 | End: 2024-01-01 | Stop reason: HOSPADM

## 2024-01-01 RX ORDER — FUROSEMIDE 20 MG
10 TABLET ORAL EVERY MORNING
COMMUNITY
Start: 2024-01-01

## 2024-01-01 RX ORDER — CARVEDILOL 6.25 MG/1
12.5 TABLET ORAL 2 TIMES DAILY WITH MEALS
Status: DISCONTINUED | OUTPATIENT
Start: 2024-01-01 | End: 2024-01-01 | Stop reason: HOSPADM

## 2024-01-01 RX ORDER — IPRATROPIUM BROMIDE AND ALBUTEROL SULFATE 2.5; .5 MG/3ML; MG/3ML
3 SOLUTION RESPIRATORY (INHALATION) ONCE
Status: COMPLETED | OUTPATIENT
Start: 2024-01-01 | End: 2024-01-01

## 2024-01-01 RX ORDER — ALBUTEROL SULFATE 90 UG/1
4 AEROSOL, METERED RESPIRATORY (INHALATION)
Status: DISCONTINUED | OUTPATIENT
Start: 2024-01-01 | End: 2024-01-01 | Stop reason: HOSPADM

## 2024-01-01 RX ORDER — ACETAMINOPHEN 500 MG
1000 TABLET ORAL EVERY 4 HOURS PRN
Status: DISCONTINUED | OUTPATIENT
Start: 2024-01-01 | End: 2024-01-01 | Stop reason: HOSPADM

## 2024-01-01 RX ORDER — FUROSEMIDE 20 MG
10 TABLET ORAL DAILY PRN
COMMUNITY

## 2024-01-01 RX ORDER — ONDANSETRON 4 MG/1
4 TABLET, ORALLY DISINTEGRATING ORAL EVERY 6 HOURS PRN
Status: DISCONTINUED | OUTPATIENT
Start: 2024-01-01 | End: 2024-01-01 | Stop reason: HOSPADM

## 2024-01-01 RX ORDER — LISINOPRIL 10 MG/1
20 TABLET ORAL 2 TIMES DAILY
Status: DISCONTINUED | OUTPATIENT
Start: 2024-01-01 | End: 2024-01-01 | Stop reason: HOSPADM

## 2024-01-01 RX ORDER — MELATONIN 10 MG
1 CAPSULE ORAL
COMMUNITY

## 2024-01-01 RX ADMIN — IPRATROPIUM BROMIDE AND ALBUTEROL SULFATE 3 ML: .5; 3 SOLUTION RESPIRATORY (INHALATION) at 03:52

## 2024-01-01 RX ADMIN — DULOXETINE HYDROCHLORIDE 30 MG: 30 CAPSULE, DELAYED RELEASE PELLETS ORAL at 18:49

## 2024-01-01 RX ADMIN — PANTOPRAZOLE SODIUM 40 MG: 40 TABLET, DELAYED RELEASE ORAL at 18:40

## 2024-01-01 RX ADMIN — IPRATROPIUM BROMIDE AND ALBUTEROL SULFATE 3 ML: .5; 3 SOLUTION RESPIRATORY (INHALATION) at 10:45

## 2024-01-01 RX ADMIN — CARVEDILOL 12.5 MG: 6.25 TABLET, FILM COATED ORAL at 18:39

## 2024-01-01 RX ADMIN — ACETAMINOPHEN 1000 MG: 500 TABLET ORAL at 15:25

## 2024-01-01 RX ADMIN — FUROSEMIDE 40 MG: 10 INJECTION, SOLUTION INTRAMUSCULAR; INTRAVENOUS at 12:39

## 2024-01-01 RX ADMIN — Medication 10 MG: at 21:36

## 2024-01-01 RX ADMIN — WARFARIN SODIUM 3 MG: 1 TABLET ORAL at 18:39

## 2024-01-01 RX ADMIN — LISINOPRIL 20 MG: 10 TABLET ORAL at 21:36

## 2024-01-01 RX ADMIN — ISOSORBIDE MONONITRATE 15 MG: 30 TABLET, EXTENDED RELEASE ORAL at 18:50

## 2024-01-01 ASSESSMENT — ACTIVITIES OF DAILY LIVING (ADL)
ADLS_ACUITY_SCORE: 39
ADLS_ACUITY_SCORE: 63
ADLS_ACUITY_SCORE: 51
ADLS_ACUITY_SCORE: 39
ADLS_ACUITY_SCORE: 51
ADLS_ACUITY_SCORE: 63
ADLS_ACUITY_SCORE: 39
ADLS_ACUITY_SCORE: 51
ADLS_ACUITY_SCORE: 63
ADLS_ACUITY_SCORE: 59
ADLS_ACUITY_SCORE: 39
ADLS_ACUITY_SCORE: 51
ADLS_ACUITY_SCORE: 39
ADLS_ACUITY_SCORE: 51
ADLS_ACUITY_SCORE: 43
ADLS_ACUITY_SCORE: 51
ADLS_ACUITY_SCORE: 63
ADLS_ACUITY_SCORE: 39
ADLS_ACUITY_SCORE: 63
ADLS_ACUITY_SCORE: 63
DEPENDENT_IADLS:: CLEANING;COOKING;LAUNDRY;SHOPPING;MEAL PREPARATION;MEDICATION MANAGEMENT;MONEY MANAGEMENT;TRANSPORTATION
ADLS_ACUITY_SCORE: 43

## 2024-01-01 ASSESSMENT — COLUMBIA-SUICIDE SEVERITY RATING SCALE - C-SSRS
2. HAVE YOU ACTUALLY HAD ANY THOUGHTS OF KILLING YOURSELF IN THE PAST MONTH?: NO
6. HAVE YOU EVER DONE ANYTHING, STARTED TO DO ANYTHING, OR PREPARED TO DO ANYTHING TO END YOUR LIFE?: NO
1. IN THE PAST MONTH, HAVE YOU WISHED YOU WERE DEAD OR WISHED YOU COULD GO TO SLEEP AND NOT WAKE UP?: NO

## 2024-05-19 PROBLEM — I50.9 CONGESTIVE HEART FAILURE, UNSPECIFIED HF CHRONICITY, UNSPECIFIED HEART FAILURE TYPE (H): Status: ACTIVE | Noted: 2024-01-01

## 2024-05-19 NOTE — ED NOTES
Pt states she has had trouble with her swallowing for years. Pt is able to take small sips of water without complaints or coughing.

## 2024-05-19 NOTE — ED TRIAGE NOTES
PT was brought in by EMS from [facility] in [place]. PT has been feeling SOB, and she feels like she can't cough anything up, and something is stuck in her throat when she has to cough. PT was on 2L O2 that was put on by EMS, but is now sating 95% O2 on RA.

## 2024-05-19 NOTE — PHARMACY-ANTICOAGULATION SERVICE
Clinical Pharmacy - Warfarin Dosing Consult     Pharmacy has been consulted to manage this patient s warfarin therapy.  Indication: Atrial Fibrillation  Therapy Goal: INR 2-3  Warfarin PTA Regimen: 2 mg Wednesday; 3 mg all other days of the week  Recent documented change in oral intake/nutrition: Unknown    INR   Date Value Ref Range Status   05/19/2024 2.60 (H) 0.85 - 1.15 Final   06/15/2023 6.83 (HH) 0.85 - 1.15 Final       Recommend warfarin 3 mg today.  Pharmacy will monitor Radha Hennessy daily and order warfarin doses to achieve specified goal.      Please contact pharmacy as soon as possible if the warfarin needs to be held for a procedure or if the warfarin goals change.      Aubrey Quiñonez RPH on 5/19/2024 at 4:13 PM

## 2024-05-19 NOTE — LETTER
Transition Communication Hand-off for Care Transitions to Next Level of Care Provider    Name: Radha Hennessy  : 1924  MRN #: [id number]  Primary Care Provider: Physician No Ref-Primary     Primary Clinic: No address on file     Reason for Hospitalization:  Congestive heart failure, unspecified HF chronicity, unspecified heart failure type (H) [I50.9]  Admit Date/Time: 2024 10:27 AM  Discharge Date: ***  Payor Source: Payor: MEDICARE / Plan: MEDICARE / Product Type: Medicare /     Readmission Assessment Measure (LAILA) Risk Score/category: ***    Plan of Care Goals/Milestone Events:   Patient Concerns: ***   Patient Goals:   Short-term ***   Long-term ***   Medical Goals   Short-term ***   Long-term ***         Reason for Communication Hand-off Referral: {CCREASONCMCTNHANDOFFREFERRALCTS:90588}    Discharge Plan:       Concern for non-adherence with plan of care:   Y/N ***  Discharge Needs Assessment:  Needs      Flowsheet Row Most Recent Value   Equipment Currently Used at Home wheelchair, power   # of Referrals Placed by  External Care Coordination, Hospice, Post Acute Facilities, Transportation            Already enrolled in Tele-monitoring program and name of program:  ***  Follow-up specialty is recommended: {YES / NO:20047}    Follow-up plan:  No future appointments.    Any outstanding tests or procedures:              Key Recommendations:      DANTE Holloway    AVS/Discharge Summary is the source of truth; this is a helpful guide for improved communication of patient story

## 2024-05-19 NOTE — ED PROVIDER NOTES
"  History     Chief Complaint   Patient presents with    Shortness of Breath     HPI  Radha Hennessy is a 89 year old female who presents from living facility with dyspnea.  She had told staff she felt like she had something stuck in her throat.  She was having difficulty breathing.  History of atrial fibrillation and some congestive heart failure.  Paramedics put her on 2 L of oxygen.  Oxygen saturations are in the mid 90s.  Patient is extremely hard of hearing and a poor historian.    Allergies:  Allergies   Allergen Reactions    Cefuroxime Diarrhea    Hydrochlorothiazide Other (See Comments)     hyponatremia    Nitrofurantoin Other (See Comments)     Dizzy, headache, \"sick all over\"    Oxybutynin Other (See Comments)     Dizziness ,lightheaded, urine retention on 10 mg dose    Sulfa Antibiotics Unknown    Amoxicillin Other (See Comments)     Diarrhea       Problem List:    Patient Active Problem List    Diagnosis Date Noted    Congestive heart failure, unspecified HF chronicity, unspecified heart failure type (H) 05/19/2024     Priority: Medium    Hypoxemia 02/23/2023     Priority: Medium    SIRS (systemic inflammatory response syndrome) (H) 02/23/2023     Priority: Medium    Community acquired pneumonia, unspecified laterality 02/23/2023     Priority: Medium    Current use of long term anticoagulation 02/20/2023     Priority: Medium    Right knee pain 02/18/2023     Priority: Medium        Past Medical History:    Past Medical History:   Diagnosis Date    Anxiety     Chronic anticoagulation     DJD (degenerative joint disease) of knee     HTN (hypertension)     Mitral insufficiency     Osteopenia     PAF (paroxysmal atrial fibrillation) (H)     Raynaud's syndrome     Recurrent UTI     SBO (small bowel obstruction) (H)     SSS (sick sinus syndrome) (H)     Urinary incontinence        Past Surgical History:    Past Surgical History:   Procedure Laterality Date    APPENDECTOMY      AS LAP,LYSIS OF ADHESIONS   "    CHOLECYSTECTOMY      HYSTERECTOMY      IMPLANT PACEMAKER      STAPEDECTOMY      TONSILLECTOMY         Family History:    Family History   Problem Relation Age of Onset    Colon Cancer Mother     Cerebrovascular Disease Father     Kidney Disease Sister     Breast Cancer Maternal Grandmother        Social History:  Marital Status:   [2]  Social History     Tobacco Use    Smoking status: Never    Smokeless tobacco: Never        Medications:    acetaminophen (TYLENOL) 650 MG CR tablet  albuterol (PROAIR HFA/PROVENTIL HFA/VENTOLIN HFA) 108 (90 Base) MCG/ACT inhaler  calcium carbonate antacid 1000 MG CHEW  carvedilol (COREG) 6.25 MG tablet  cholecalciferol (VITAMIN D3) 25 mcg (1000 units) capsule  Diaper Rash Products (A+D DIAPER RASH) CREA  HYDROcodone-acetaminophen (NORCO) 5-325 MG tablet  ketoconazole (NIZORAL) 2 % external cream  lactulose (CHRONULAC) 10 GM/15ML solution  Lidocaine (LIDOCARE) 4 % Patch  lisinopril (ZESTRIL) 20 MG tablet  omeprazole (PRILOSEC) 20 MG DR capsule  polyethylene glycol (MIRALAX) 17 GM/Dose powder  senna-docusate (SENOKOT-S/PERICOLACE) 8.6-50 MG tablet  vitamin B-12 (CYANOCOBALAMIN) 1000 MCG tablet  warfarin ANTICOAGULANT (COUMADIN) 7.5 MG tablet          Review of Systems   Unable to perform ROS: Other       Physical Exam   BP: (!) 167/110  Pulse: 116  Temp: 97.8  F (36.6  C)  Resp: 20  Weight: 51.7 kg (114 lb)  SpO2: 94 %      Physical Exam  Vitals and nursing note reviewed.   Constitutional:       General: She is not in acute distress.     Appearance: She is well-developed. She is not diaphoretic.   HENT:      Head: Normocephalic and atraumatic.      Nose: Nose normal.      Mouth/Throat:      Mouth: Mucous membranes are dry.      Pharynx: Oropharynx is clear.   Eyes:      General: No scleral icterus.     Conjunctiva/sclera: Conjunctivae normal.   Cardiovascular:      Rate and Rhythm: Tachycardia present. Rhythm irregularly irregular.      Heart sounds: Normal heart sounds. No  murmur heard.  Pulmonary:      Effort: Pulmonary effort is normal. No respiratory distress.      Breath sounds: No stridor. No wheezing or rales.   Abdominal:      General: Abdomen is flat. There is no distension.      Palpations: Abdomen is soft. There is no mass.      Tenderness: There is no abdominal tenderness. There is no guarding or rebound.   Musculoskeletal:         General: No tenderness.      Cervical back: Normal range of motion and neck supple.   Skin:     General: Skin is warm and dry.      Coloration: Skin is not pale.      Findings: No erythema or rash.   Neurological:      General: No focal deficit present.      Mental Status: She is alert.   Psychiatric:         Mood and Affect: Mood normal.         ED Course        Procedures         EKG reveals atrial fibrillation at 120 bpm.  No acute ST segment or T wave changes noted.  Interpreted by myself.       Results for orders placed or performed during the hospital encounter of 05/19/24 (from the past 24 hour(s))   CBC with platelets differential    Narrative    The following orders were created for panel order CBC with platelets differential.  Procedure                               Abnormality         Status                     ---------                               -----------         ------                     CBC with platelets and d...[[id number]]  Abnormal            Final result                 Please view results for these tests on the individual orders.   Comprehensive metabolic panel   Result Value Ref Range    Sodium 139 135 - 145 mmol/L    Potassium 3.9 3.4 - 5.3 mmol/L    Carbon Dioxide (CO2) 27 22 - 29 mmol/L    Anion Gap 10 7 - 15 mmol/L    Urea Nitrogen 26.8 (H) 8.0 - 23.0 mg/dL    Creatinine 0.64 0.51 - 0.95 mg/dL    GFR Estimate 79 >60 mL/min/1.73m2    Calcium 9.4 8.2 - 9.6 mg/dL    Chloride 102 98 - 107 mmol/L    Glucose 107 (H) 70 - 99 mg/dL    Alkaline Phosphatase 68 40 - 150 U/L    AST 32 0 - 45 U/L    ALT 20 0 - 50 U/L    Protein  Total 6.6 6.4 - 8.3 g/dL    Albumin 4.2 3.5 - 5.2 g/dL    Bilirubin Total 0.8 <=1.2 mg/dL   Nt probnp inpatient (BNP)   Result Value Ref Range    N terminal Pro BNP Inpatient 3,303 (H) 0 - 1,800 pg/mL   Troponin T, High Sensitivity   Result Value Ref Range    Troponin T, High Sensitivity 28 (H) <=14 ng/L   INR   Result Value Ref Range    INR 2.60 (H) 0.85 - 1.15   Partial thromboplastin time   Result Value Ref Range    aPTT 39 (H) 22 - 38 Seconds   CBC with platelets and differential   Result Value Ref Range    WBC Count 10.1 4.0 - 11.0 10e3/uL    RBC Count 3.33 (L) 3.80 - 5.20 10e6/uL    Hemoglobin 8.5 (L) 11.7 - 15.7 g/dL    Hematocrit 29.2 (L) 35.0 - 47.0 %    MCV 88 78 - 100 fL    MCH 25.5 (L) 26.5 - 33.0 pg    MCHC 29.1 (L) 31.5 - 36.5 g/dL    RDW 17.2 (H) 10.0 - 15.0 %    Platelet Count 228 150 - 450 10e3/uL    % Neutrophils 80 %    % Lymphocytes 9 %    % Monocytes 9 %    % Eosinophils 1 %    % Basophils 1 %    % Immature Granulocytes 0 %    NRBCs per 100 WBC 0 <1 /100    Absolute Neutrophils 8.1 1.6 - 8.3 10e3/uL    Absolute Lymphocytes 1.0 0.8 - 5.3 10e3/uL    Absolute Monocytes 1.0 0.0 - 1.3 10e3/uL    Absolute Eosinophils 0.1 0.0 - 0.7 10e3/uL    Absolute Basophils 0.1 0.0 - 0.2 10e3/uL    Absolute Immature Granulocytes 0.0 <=0.4 10e3/uL    Absolute NRBCs 0.0 10e3/uL   XR Chest Port 1 View    Narrative    EXAM: XR CHEST PORTABLE 1 VIEW  LOCATION: [facility]  DATE: 05/19/2024    INDICATION: Short of air.  COMPARISON: Chest film 02/23/2023.      Impression    IMPRESSION: Opacification of the lower left thorax is new since previous study and may reflect a combination of consolidation and pleural fluid. Right lung is clear. Cardiac enlargement. Normal pulmonary vascularity. Cardiac pacemaker device is   unchanged. Severe degenerative changes of the shoulders.           Medications   ipratropium - albuterol 0.5 mg/2.5 mg/3 mL (DUONEB) neb solution 3 mL (3 mLs Nebulization $Given  5/19/24 1045)   furosemide (LASIX) injection 40 mg (40 mg Intravenous $Given 5/19/24 1232)     12:49 PM breathing is improved after nebulized treatment.  Able to talk with the patient.  Chest x-ray shows some congestive heart failure likely    Assessments & Plan (with Medical Decision Making)  89-year-old female presented with some difficulty breathing.  History of atrial fibrillation.  She appears to have a component of likely pleural effusion and congestive heart failure.  Initially needing some oxygen at 2 to 4 L nasal cannula.  Last at 12:45 PM.  Actually on room air.  Still somewhat tachycardic at 120 bpm.  Had a discussion with her great niece, Paz.  She is in agreement to bring the patient in for observation.  Patient accepted by Dr. Wheeler.  Patient given IV dose of Lasix.      I have reviewed the nursing notes.    I have reviewed the findings, diagnosis, plan and need for follow up with the patient.          New Prescriptions    No medications on file       Final diagnoses:   Congestive heart failure, unspecified HF chronicity, unspecified heart failure type (H)       5/19/2024   [facility] [place] EMERGENCY DEPT       Fransisco Dunn MD  05/19/24 1250

## 2024-05-19 NOTE — PROGRESS NOTES
05/19/24 1045   Vital Signs   Oximeter Heart Rate 131 bpm   Oxygen Therapy   SpO2 90 %   O2 Device None (Room air)   Nebulizer Assessment & Treatment   $RT Use ONLY Delivery Method Nebulizer - Initial   Nebulizer Device Mask   Pretreatment Heart Rate (beats/min) 103   Pretreatment Resp Rate (breaths/min) 30   Pretreat Breath Sounds - Bilat - All Lobes clear   Pretreat Breath Sounds - BERNARD clear   Pretreat Breath Sounds - LLL crackles   Pretreat Breath Sounds - RUL clear   Pretreat Breath Sounds - RML crackles   Pretreat Breath Sounds - RLL crackles   Patient Position Jameel's   Respiratory Treatment Status (SVN) given   Breath Sounds Post-Respiratory Treatment   Posttreatment Heart Rate (beats/min) 113   Posttreatment Resp Rate (breaths/min) 32   Posttreatment Assessment (SVN) breath sounds unchanged   Signs of Intolerance (SVN) none   Breath Sounds Posttreatment All Fields diminished   Breath Sounds Posttreatment BERNARD clear   Breath Sounds Posttreatment LLL crackles   Breath Sounds Posttreatment RUL clear   Breath Sounds Posttreatment RML crackles   Breath Sounds Posttreatment RLL crackles     Crackles in lower lobes pre and post neb unchanged  Frequent clearing of throat and upper airway congestion   Coughing to clear throat and increased work of breathing potentially baseline do to age

## 2024-05-19 NOTE — ED NOTES
Dr. Dunn spoke to Paz regarding admit, and he states that she is in agreement with pt being admitted at this time.

## 2024-05-19 NOTE — MEDICATION SCRIBE - ADMISSION MEDICATION HISTORY
Medication Scribe Admission Medication History    Admission medication history is complete. The information provided in this note is only as accurate as the sources available at the time of the update.    Information Source(s): Caregiver, Facility (TCU/NH/) medication list/MAR, and CareEverywhere/SureScripts via N/A    Pertinent Information: [facility] (Kristine [phone]) faxed current MAR, PTA med list was sent with patient.    Changes made to PTA medication list:  Added: lasix, azithromycin, duoneb, duloxetine, isosorbide mononitrate, melatonin  Deleted: D3,Nizoral cream, B12  Changed: updated warfarin regimen & norco regimen & Senna-docusate regimen & albuterol sig, omeprazole is BID, miralax is prn.    Allergies reviewed with patient and updates made in EHR: yes    Medication History Completed By: JOSE OMALLEY 5/19/2024 2:43 PM    PTA Med List   Medication Sig Last Dose    acetaminophen (TYLENOL) 650 MG CR tablet Take 1,300 mg by mouth 2 times daily 11am and 6pm 5/18/2024 at 1800    albuterol (PROAIR HFA/PROVENTIL HFA/VENTOLIN HFA) 108 (90 Base) MCG/ACT inhaler Inhale 4 puffs into the lungs every hour as needed for shortness of breath 5/19/2024 at 0820    azithromycin (ZITHROMAX) 250 MG tablet Take 250 mg by mouth daily 8am 5/19/2024 at 0830    calcium carbonate antacid 1000 MG CHEW Take 1,000 mg by mouth daily (with dinner) 6pm 5/18/2024 at 1800    carvedilol (COREG) 12.5 MG tablet Take 12.5 mg by mouth 2 times daily (with meals) 11am and 11pm 5/18/2024 at 2300    Diaper Rash Products (A+D DIAPER RASH) CREA Apply topically as needed Unknown at unkn    DULoxetine (CYMBALTA) 30 MG capsule Take 30 mg by mouth daily at 4pm 5/18/2024 at 1500    furosemide (LASIX) 20 MG tablet Take 10 mg by mouth every morning 0800 scheduled and 10am as needed for weight gain (3lbs/day or 5lbs/week) 5/19/2024 at 0832    furosemide (LASIX) 20 MG tablet Take 10 mg by mouth daily as needed (at 10am for weight gain 3lbs/day or  5lbs/week) Takes 10mg every morning at 8am scheduled Unknown at unkn    HYDROcodone-acetaminophen (NORCO) 5-325 MG tablet Take 1 tablet by mouth nightly as needed for severe pain (7-10) (Patient taking differently: Take 1 tablet by mouth daily (with dinner) Scheduled and 0.5 tablet daily as needed for pain) 5/18/2024 at 1800    ipratropium - albuterol 0.5 mg/2.5 mg/3 mL (DUONEB) 0.5-2.5 (3) MG/3ML neb solution Take 1 vial by nebulization 2 times daily 11am and 9pm scheduled and every 2 hours as needed for shortness of breath 5/18/2024 at 2100    ipratropium - albuterol 0.5 mg/2.5 mg/3 mL (DUONEB) 0.5-2.5 (3) MG/3ML neb solution Take 1 vial by nebulization every 2 hours as needed for shortness of breath 5/19/2024 at 0910    isosorbide mononitrate (IMDUR) 30 MG 24 hr tablet Take 15 mg by mouth daily at 4pm 5/18/2024 at 1500    lactulose (CHRONULAC) 10 GM/15ML solution Take 30 mLs by mouth daily as needed for constipation Unknown at unkn    Lidocaine (LIDOCARE) 4 % Patch Place 1 patch onto the skin every 24 hours To prevent lidocaine toxicity, patient should be patch free for 12 hrs daily. (Patient taking differently: Place 1 patch onto the skin every 24 hours On Right Knee 8am and off 8pm (To prevent lidocaine toxicity, patient should be patch free for 12 hrs daily))  at held    lisinopril (ZESTRIL) 20 MG tablet Take 20 mg by mouth 2 times daily 11am and 6pm 5/18/2024 at 1800    Melatonin 10 MG CAPS Take 1 capsule by mouth daily (with dinner) 5/18/2024 at 1800    omeprazole (PRILOSEC) 20 MG DR capsule Take 20 mg by mouth 2 times daily 3pm and 6pm 5/18/2024 at 1800    polyethylene glycol (MIRALAX) 17 GM/Dose powder Take 17 g by mouth daily Unknown at unkn    senna-docusate (SENOKOT-S/PERICOLACE) 8.6-50 MG tablet Take 2 tablets by mouth nightly as needed for constipation Unknown at unkn    senna-docusate (SENOKOT-S/PERICOLACE) 8.6-50 MG tablet Take 1 tablet by mouth daily at 4pm And 2 tablets at bedtime as needed for  constipation 5/18/2024 at 1500    warfarin ANTICOAGULANT (COUMADIN) 2 MG tablet Take 2 mg by mouth At 6pm on Wednesdays, 3mg all other days of the week 5/15/2024 at 1800    warfarin ANTICOAGULANT (COUMADIN) 3 MG tablet Take 3 mg by mouth At 6pm on all days except Wednesdays (2mg on Wednesdays) 5/18/2024 at 1800

## 2024-05-19 NOTE — ED NOTES
Writer received a call from Justina (staff at [facility]) who states she spoke to Paz Edwin (pts POA/family) and pt will be starting hospice on Monday and can not be admitted. Justina also states that Paz will not be available to transport pt home today and alternative transport will have to be arranged.

## 2024-05-19 NOTE — PLAN OF CARE
"Goal Outcome Evaluation:      Plan of Care Reviewed With: patient    Overall Patient Progress: no changeOverall Patient Progress: no change         S-(situation): Patient arrives to room 249 via cart from ED.     B-(background): CHF    A-(assessment): Patient is alert and disoriented to place, situation. She  wears a left hearing aid. Patient repeatedly says that \" I am just here for sore throat and a lot are done.\" Underweight, uses WC baseline. Writer contacted JOANNE Mullen, as patient \"needs to talk to her.\" Paz will come later tonight to visit.    BP (!) 173/109   Pulse (!) 128   Temp 98.6  F (37  C) (Oral)   Resp 20   Wt 46.5 kg (102 lb 8.2 oz)   SpO2 93%   BMI 18.75 kg/m        R-(recommendations): Orders reviewed with patient. Will monitor patient per MD orders.     Inpatient nursing criteria listed below were met:    Health care directives status obtained and documented:  Patient has no scanned ACP docs.  VTE ordered/documented: Yes  Skin issues/needs documented:Yes  Isolation addressed and Signage used: NA  Fall Prevention: Care plan updated Yes Education given and documented Yes  Care Plan initiated and Co-Morbidities added: Yes  Education Assessment documented:Yes  Admission Education Documented: Yes  If present CAUTI/CLABI Education done: NA  New medication patient education completed and documented (Possible Side Effects of Common Medications handout): Yes  Allergies Reviewed: Yes  Admission Medication Reconciliation completed: Yes  Home medications if not able to send immediately home with family stored here: NA  Reminder note placed in discharge instructions regarding home meds: NA  Individualized care needs/preferences addressed and charted: Yes  Provider Notified that patient has arrived to the unit: Yes        "

## 2024-05-19 NOTE — H&P
[facility]    History and Physical - Hospitalist Service       Date of Admission:  5/19/2024    Assessment & Plan      Radha Hennessy is a 89-year-old female with a past medical history of HLD, GERD, hypertension, SSS s/p PPM, paroxysmal A-fib on warfarin, osteoarthritis involving the knees.  Patient is presenting from assisted living facility due to concerns of dyspnea in the setting of possible aspiration.  Imaging obtained this admission shows opacification involving the left left lower thorax new.  There is no evidence of leukocytosis and CBC.  INR is therapeutic.  She does have elevated BNP.    # Airspace disease - concern for dysphagia.  # Dyspnea on exertion  # Elevated BNP  -Presented to the hospital with acute onset of dyspnea.  -Workup here does show elevated BNP, chest x-ray with consolidation, pleural fluid more pronounced in left lower thorax  -Received Lasix IV in the ER with adequate urine output  -Per discussion with niece, patient recently diagnosed with heart failure.  -Hold off on further diuresis at this time  -Likely to have combination of aspiration pneumonitis, low concern for pneumonia at this time  -Speech eval in the morning  -Consider palliative care consult for further clarification of goals of care.  Per discussion with patient, she would like to continue to eat at this time.      # Hypertensive crisis, with baseline history of hypertension-resume PTA lisinopril 20 mg twice daily, also on Coreg hospital.  As needed hydralazine.  Goal for BP <140/90 by tomorrow morning.  Also continue PTA Imdur    # Paroxysmal C-wfe-[id number] PTA Coreg.  INR therapeutic.  Pharmacy to dose warfarin.    # Hypercoagulable state, iatrogenic  # Sick sinus syndrome s/p PPM      # GERD-continue PTA omeprazole    # Bilateral shoulder pain of chronic onset  # Depression-continue PTA duloxetine  # History of dysphagia-SLP consult  # Goals of care-following discussion with patient's  grand niece, plan is to transition patient to hospice in the near future.            Diet: Pureed Diet (level 4) Liquidized/Moderately Thick (level 3)  DVT Prophylaxis: Warfarin  Godinez Catheter: Not present  Lines: None     Cardiac Monitoring: None  Code Status: No CPR- Do NOT Intubate    Clinically Significant Risk Factors Present on Admission               # Drug Induced Coagulation Defect: home medication list includes an anticoagulant medication    # Hypertension: Home medication list includes antihypertensive(s)                 Disposition Plan     Medically Ready for Discharge: Anticipated in 2-4 Days           JAMAR MAN MD  Hospitalist Service  [facility]  Securely message with [employer] (more info)  Text page via [facility] Paging/Directory     ______________________________________________________________________    Chief Complaint   Difficulty breathing    History is obtained from the patient and patient's grand niece    History of Present Illness   Radha Hennessy is a 89 year old female who has a past medical history of HLD, GERD, hypertension, SSS s/p PPM, paroxysmal A-fib on warfarin, osteoarthritis involving the knees.  Patient is presenting from assisted living facility due to concerns of difficulty breathing.  Per patient, she woke up this morning feeling short of breath, coughing persistently which prompted people at her [facility] bring her to the hospital for further evaluation.  She denies fever, chills, chest pain or abdominal pain.  Of note, patient has a history of dysphagia which she is aware of and she would like to continue to eat and drink normally at this time.  Per discussion with patient's grand niece, they are planning on transitioning patient to hospice.      Past Medical History    Past Medical History:   Diagnosis Date    Anxiety     Chronic anticoagulation     DJD (degenerative joint disease) of knee     HTN (hypertension)     Mitral insufficiency      Osteopenia     PAF (paroxysmal atrial fibrillation) (H)     Raynaud's syndrome     Recurrent UTI     SBO (small bowel obstruction) (H)     SSS (sick sinus syndrome) (H)     Urinary incontinence        Past Surgical History   Past Surgical History:   Procedure Laterality Date    APPENDECTOMY      AS LAP,LYSIS OF ADHESIONS      CHOLECYSTECTOMY      HYSTERECTOMY      IMPLANT PACEMAKER      STAPEDECTOMY      TONSILLECTOMY         Prior to Admission Medications   Prior to Admission Medications   Prescriptions Last Dose Informant Patient Reported? Taking?   DULoxetine (CYMBALTA) 30 MG capsule 5/18/2024 at 1500  Yes Yes   Sig: Take 30 mg by mouth daily at 4pm   Diaper Rash Products (A+D DIAPER RASH) CREA Unknown at [facility] Yes Yes   Sig: Apply topically as needed   HYDROcodone-acetaminophen (NORCO) 5-325 MG tablet 5/18/2024 at [address] [place] [address] No Yes   Sig: Take 1 tablet by mouth nightly as needed for severe pain (7-10)   Patient taking differently: Take 1 tablet by mouth daily (with dinner) Scheduled and 0.5 tablet daily as needed for pain   Lidocaine (LIDOCARE) 4 % Patch  at [facility] No Yes   Sig: Place 1 patch onto the skin every 24 hours To prevent lidocaine toxicity, patient should be patch free for 12 hrs daily.   Patient taking differently: Place 1 patch onto the skin every 24 hours On Right Knee 8am and off 8pm (To prevent lidocaine toxicity, patient should be patch free for 12 hrs daily)   Melatonin 10 MG CAPS 5/18/2024 at 1800  Yes Yes   Sig: Take 1 capsule by mouth daily (with dinner)   acetaminophen (TYLENOL) 650 MG CR tablet 5/18/2024 at [address] [place] [address] Yes Yes   Sig: Take 1,300 mg by mouth 2 times daily 11am and 6pm   albuterol (PROAIR HFA/PROVENTIL HFA/VENTOLIN HFA) 108 (90 Base) MCG/ACT inhaler 5/19/2024 at 0820 [facility] Yes Yes   Sig: Inhale 4 puffs into the lungs every hour as needed for shortness of breath   azithromycin (ZITHROMAX) 250 MG tablet 5/19/2024 at 0830  Yes Yes    Sig: Take 250 mg by mouth daily 8am   calcium carbonate antacid 1000 MG CHEW 5/18/2024 at 1800 [facility] Yes Yes   Sig: Take 1,000 mg by mouth daily (with dinner) 6pm   carvedilol (COREG) 12.5 MG tablet 5/18/2024 at 2300  Yes Yes   Sig: Take 12.5 mg by mouth 2 times daily (with meals) 11am and 11pm   furosemide (LASIX) 20 MG tablet 5/19/2024 at 0832  Yes Yes   Sig: Take 10 mg by mouth every morning 0800 scheduled and 10am as needed for weight gain (3lbs/day or 5lbs/week)   furosemide (LASIX) 20 MG tablet Unknown at [facility]  Yes Yes   Sig: Take 10 mg by mouth daily as needed (at 10am for weight gain 3lbs/day or 5lbs/week) Takes 10mg every morning at 8am scheduled   ipratropium - albuterol 0.5 mg/2.5 mg/3 mL (DUONEB) 0.5-2.5 (3) MG/3ML neb solution 5/18/2024 at 2100  Yes Yes   Sig: Take 1 vial by nebulization 2 times daily 11am and 9pm scheduled and every 2 hours as needed for shortness of breath   ipratropium - albuterol 0.5 mg/2.5 mg/3 mL (DUONEB) 0.5-2.5 (3) MG/3ML neb solution 5/19/2024 at 0910  Yes Yes   Sig: Take 1 vial by nebulization every 2 hours as needed for shortness of breath   isosorbide mononitrate (IMDUR) 30 MG 24 hr tablet 5/18/2024 at 1500  Yes Yes   Sig: Take 15 mg by mouth daily at 4pm   lactulose (CHRONULAC) 10 GM/15ML solution Unknown at [facility] Yes Yes   Sig: Take 30 mLs by mouth daily as needed for constipation   lisinopril (ZESTRIL) 20 MG tablet 5/18/2024 at 1800 [facility] Yes Yes   Sig: Take 20 mg by mouth 2 times daily 11am and 6pm   omeprazole (PRILOSEC) 20 MG DR capsule 5/18/2024 at 1800 [facility] Yes Yes   Sig: Take 20 mg by mouth 2 times daily 3pm and 6pm   polyethylene glycol (MIRALAX) 17 GM/Dose powder [facility] Yes Yes   Sig: Take 17 g by mouth daily as needed for constipation   senna-docusate (SENOKOT-S/PERICOLACE) 8.6-50 MG tablet 5/18/2024 at [address] [place] [address] Yes Yes   Sig: Take 1 tablet by mouth daily at 4pm And 2 tablets at bedtime as needed  for constipation   senna-docusate (SENOKOT-S/PERICOLACE) 8.6-50 MG tablet Unknown at [facility]  Yes Yes   Sig: Take 2 tablets by mouth nightly as needed for constipation   warfarin ANTICOAGULANT (COUMADIN) 2 MG tablet 5/15/2024 at 1800  Yes Yes   Sig: Take 2 mg by mouth At 6pm on Wednesdays, 3mg all other days of the week   warfarin ANTICOAGULANT (COUMADIN) 3 MG tablet 5/18/2024 at 1800  Yes Yes   Sig: Take 3 mg by mouth At 6pm on all days except Wednesdays (2mg on Wednesdays)      Facility-Administered Medications: None           Physical Exam   Vital Signs: Temp: 98  F (36.7  C) Temp src: Oral BP: (!) 170/97 Pulse: 86   Resp: 18 SpO2: 93 % O2 Device: None (Room air)    Weight: 102 lbs 8.22 oz    General Appearance: Elderly female, lying in bed, on room air, in no acute distress or discomfort.    HEENT: PERRL: EOMI; moist mucous membrane w/o lesions  Neck: No JVD  Pulmonary: Decreased breath sounds in bilateral bases with crackles heard on auscultation.  Lungs are clear to auscultation in upper lung fields.  CVS: Irregularly irregular rhythm, tachycardic, normal S1-S2.  GI: BS (+), soft nontender, no rebound or guarding   Extremities: No LE edema   Skin: No rashes or lesions  Neurologic: A&O x3      Medical Decision Making       65 MINUTES SPENT BY ME on the date of service doing chart review, history, exam, documentation & further activities per the note.      Data   ------------------------- PAST 24 HR DATA REVIEWED -----------------------------------------------    I have personally reviewed the following data over the past 24 hrs:    10.1  \   8.5 (L)   / 228     139 102 26.8 (H) /  107 (H)   3.9 27 0.64 \     ALT: 20 AST: 32 AP: 68 TBILI: 0.8   ALB: 4.2 TOT PROTEIN: 6.6 LIPASE: N/A     Trop: 28 (H) BNP: 3,303 (H)     INR:  2.60 (H) PTT:  39 (H)   D-dimer:  N/A Fibrinogen:  N/A       Imaging results reviewed over the past 24 hrs:   Recent Results (from the past 24 hour(s))   XR Chest Port 1 View    Narrative     EXAM: XR CHEST PORTABLE 1 VIEW  LOCATION: [facility]  DATE: 05/19/2024    INDICATION: Short of air.  COMPARISON: Chest film 02/23/2023.      Impression    IMPRESSION: Opacification of the lower left thorax is new since previous study and may reflect a combination of consolidation and pleural fluid. Right lung is clear. Cardiac enlargement. Normal pulmonary vascularity. Cardiac pacemaker device is   unchanged. Severe degenerative changes of the shoulders.

## 2024-05-20 PROBLEM — F32.A DEPRESSION: Status: ACTIVE | Noted: 2024-01-01

## 2024-05-20 PROBLEM — M25.511 CHRONIC PAIN OF BOTH SHOULDERS: Status: ACTIVE | Noted: 2024-01-01

## 2024-05-20 PROBLEM — K21.9 GASTROESOPHAGEAL REFLUX DISEASE WITHOUT ESOPHAGITIS: Status: ACTIVE | Noted: 2024-01-01

## 2024-05-20 PROBLEM — R13.10 DYSPHAGIA: Status: ACTIVE | Noted: 2024-01-01

## 2024-05-20 PROBLEM — J96.01 ACUTE RESPIRATORY FAILURE WITH HYPOXEMIA (H): Status: ACTIVE | Noted: 2024-01-01

## 2024-05-20 PROBLEM — R79.89 ELEVATED BRAIN NATRIURETIC PEPTIDE (BNP) LEVEL: Status: ACTIVE | Noted: 2024-01-01

## 2024-05-20 PROBLEM — Z95.0 PACEMAKER: Status: ACTIVE | Noted: 2024-01-01

## 2024-05-20 PROBLEM — I49.5 SSS (SICK SINUS SYNDROME) (H): Status: ACTIVE | Noted: 2024-01-01

## 2024-05-20 PROBLEM — G89.29 CHRONIC PAIN OF BOTH SHOULDERS: Status: ACTIVE | Noted: 2024-01-01

## 2024-05-20 PROBLEM — M25.512 CHRONIC PAIN OF BOTH SHOULDERS: Status: ACTIVE | Noted: 2024-01-01

## 2024-05-20 PROBLEM — I48.0 PAROXYSMAL ATRIAL FIBRILLATION (H): Status: ACTIVE | Noted: 2024-01-01

## 2024-05-20 PROBLEM — J69.0 ASPIRATION PNEUMONITIS (H): Status: ACTIVE | Noted: 2024-01-01

## 2024-05-20 NOTE — PROGRESS NOTES
Patient has been assessed for Home Oxygen needs. Oxygen readings:    *Pulse oximetry (SpO2) = 88% on room air at rest while awake.    *SpO2 improved to 92% on 2 liters/minute at rest.    *SpO2 = 88% on room air during activity/with exercise.    *SpO2 improved to 90% on 2 liters/minute during activity/with exercise.

## 2024-05-20 NOTE — PLAN OF CARE
Goal Outcome Evaluation:      Plan of Care Reviewed With: patient, caregiver    Overall Patient Progress: improvingOverall Patient Progress: improving     Disoriented to place, time, and situation. Requiring 2L oxygen to maintain SpO2>90%. Refuse medications and meal tray. Discharge back to assisted today. Staff and family updated on discharge plan. Report called to assisted staff. Oxygen concentrator delivered to bedside prior to discharge. Plan for discharge at 1530 via WC transport.

## 2024-05-20 NOTE — PLAN OF CARE
Goal Outcome Evaluation:      Plan of Care Reviewed With: patient    Overall Patient Progress: no changeOverall Patient Progress: no change    Outcome Evaluation: Patient failed the dysphagia screen, for speech consult tomorrow morning. Dr. Wheeler updated and informed writer that patient is here for comfort cares and will have hospice consultation during her stay in the hospital (hopefully tomorrow). New order for pureed diet. Medicines given, crushed and mixed with apple sauce with a thickened warm water. Patient able to take by herself, noted to be coughing in between. Placed on a purewick to promote rest and sleep.    BP (!) 173/109   Pulse (!) 128   Temp 98.6  F (37  C) (Oral)   Resp 20   Wt 46.5 kg (102 lb 8.2 oz)   SpO2 93%   BMI 18.75 kg/m

## 2024-05-20 NOTE — PROGRESS NOTES
Oxygen Documentation  I certify that this patient, Radha Hennessy has been under my care (or a nurse practitioner or physican's assistant working with me). This is the face-to-face encounter for oxygen medical necessity.      At the time of this encounter, I have reviewed the qualifying testing and have determined that supplemental oxygen is reasonable and necessary and is expected to improve the patient's condition in a home setting.  Patient will be enrolling in hospice in the near future with focus on comfort care going forward.     Patient has continued oxygen desaturation due to Acute Respiratory Failure J96.01 secondary to aspiration pneumonitis     If portability is ordered, is the patient mobile within the home? yes    Was this visit performed as a telehealth visit: No    Electronically Signed:  Yolanda Jauregui MD

## 2024-05-20 NOTE — PLAN OF CARE
Speech Language Pathology: Orders received. Chart reviewed and discussed with care team.? Speech Language Pathology not indicated due to Pt refused clinician after multiple presentaions of water, apple sauce, and sponge with water. Nursing staff attempted to offer liquids as well, pt refused nursing staff as well. Defer discharge recommendations to attending MD.?Will complete orders.

## 2024-05-20 NOTE — CONSULTS
Care Management Initial Consult    General Information  Assessment completed with: Family, Caregiver, Paz Terry  Type of CM/SW Visit: Initial Assessment    Primary Care Provider verified and updated as needed: Yes   Readmission within the last 30 days:        Reason for Consult: discharge planning, end of life/hospice  Advance Care Planning: Advance Care Planning Reviewed: no concerns identified        Communication Assessment  Patient's communication style: spoken language (English or Bilingual)    Hearing Difficulty or Deaf: yes   Wear Glasses or Blind: yes    Cognitive  Cognitive/Neuro/Behavioral: .WDL except, arousability, level of consciousness, mood/behavior, orientation  Level of Consciousness: lethargic  Arousal Level: arouses to touch/gentle shaking  Orientation: disoriented x 4, other (see comments) (ANGELICA)  Mood/Behavior: calm     Speech: other (see comments) (no speech)    Living Environment:   People in home: alone     Current living Arrangements: assisted living  [facility] of Facility: [facility]   Able to return to prior arrangements: yes     Family/Social Support:  Care provided by: self, other (see comments) ([facility] staff)  Provides care for: no one, unable/limited ability to care for self  Marital Status:   Other (specify) (Paz Terry)          Description of Support System: Involved, Supportive    Support Assessment: Adequate family and caregiver support    Current Resources:   Patient receiving home care services: No     Community Resources: None  Equipment currently used at home: wheelchair, power  Supplies currently used at home: Incontinence Supplies    Employment/Financial:  Employment Status: retired        Financial Concerns: none   Referral to Financial Worker: No     Does the patient's insurance plan have a 3 day qualifying hospital stay waiver?  No    Lifestyle & Psychosocial Needs:  Social Determinants of Health     Food Insecurity: Not on file   Depression: Not on file    Housing Stability: Not on file   Tobacco Use: Low Risk  (2024)    Patient History     Smoking Tobacco Use: Never     Smokeless Tobacco Use: Never     Passive Exposure: Not on file   Financial Resource Strain: Not on file   Alcohol Use: Not on file   Transportation Needs: Not on file   Physical Activity: Not on file   Interpersonal Safety: Not At Risk (2023)    Received from [facility] and [facility]    Intimate Partner Violence     Are you in a relationship where you are physically hurt, threatened and/or made to feel afraid?: No   Stress: Not on file   Social Connections: Not on file   Health Literacy: Not on file       Functional Status:  Prior to admission patient needed assistance:   Dependent ADLs:: Wheelchair-independent  Dependent IADLs:: Cleaning, Cooking, Laundry, Shopping, Meal Preparation, Medication Management, Money Management, Transportation       Mental Health Status:  Mental Health Status: No Current Concerns       Chemical Dependency Status:  Chemical Dependency Status: No Current Concerns             Values/Beliefs:  Spiritual, Cultural Beliefs, Voodoo Practices, Values that affect care: no               Additional Information:  Consult received for discharge planning. Initial assessment completed with patient's niece (POA), Paz, and [facility], RN, Alexy.    Patient resides alone at [facility]. Her   in February. Patient is wheelchair bound at baseline. She is not on home O2. She does not receive any home care services.    Per Alexy, patient has the highest level of care at the [facility]. Staff assist with:  - showers  - transfers  - housekeeping  - medication administration  - weights daily  - meals    Alexy states patient's dysphagia and swallowing difficulties are not new. [facility] staff were trying to manage patient's CHF. They are able to adjust her Lasix at the [facility] if the provider indicates this is necessary.    Per Paz, patient was going to start on  Hospice this week. Alexy, at [facility], is faxing the referral to [facility] today. Confirmed with Paz the plan remains for patient to enroll in Hospice at discharge.    Per Paz, patient will need wheelchair transport back to [facility] at discharge. Paz is aware transport is a private cost.     Update 1130- Referral placed to [facility]. This referral is already in the works, per Paz and Alexy.    Grecia Cox [facility] [phone]/ [facility] [phone]  Care Management

## 2024-05-20 NOTE — PLAN OF CARE
Goal Outcome Evaluation:      Plan of Care Reviewed With: patient    Overall Patient Progress: no change    Outcome Evaluation: Alert and oriented. Frustrated regarding admission, describes it as a 'high end kid napping'. Frustrated that her primary complaint of difficulty swallowing and throat discomfort have not been addressed in her opinion. Patient was restless throughout the night. Complained of generalized joint pain, states she usually applies icy-hot but she refused offers for this, refused tylenol. Repositioned for comfort as able but she was also refusing this. Short of breath at rest, tachypneic. Lung sounds coarse crackles, congested cough. DuoNeb x1 with minimal results. O2 on room air down to 87%, placed on 2L nasal cannula. Incontinent of urine. Unable to get patient out of bed, cries out in pain with activity assistance and unable to grasp vladimir steady. Wheelchair bound at baseline.

## 2024-05-20 NOTE — PHARMACY-ANTICOAGULATION SERVICE
Clinical Pharmacy - Warfarin Dosing Consult     Pharmacy has been consulted to manage this patient s warfarin therapy.  Indication: Atrial Fibrillation  Therapy Goal: INR 2-3  Warfarin PTA Regimen: 2 mg Wednesday; 3 mg all other days of the week  Significant drug interactions: azithromycin  Recent documented change in oral intake/nutrition: Unknown    INR   Date Value Ref Range Status   05/20/2024 2.23 (H) 0.85 - 1.15 Final   05/19/2024 2.60 (H) 0.85 - 1.15 Final       Recommend warfarin 3 mg today.  Pharmacy will monitor Radha Hennessy daily and order warfarin doses to achieve specified goal.      Please contact pharmacy as soon as possible if the warfarin needs to be held for a procedure or if the warfarin goals change.

## 2024-05-20 NOTE — PROGRESS NOTES
Care Management Discharge Note    Discharge Date: 05/21/2024     Discharge Disposition: Assisted Living, Hospice    Discharge Services: None    Discharge DME: None    Discharge Transportation: agency    Private pay costs discussed: transportation costs    Does the patient's insurance plan have a 3 day qualifying hospital stay waiver?  No    PAS Confirmation Code:  N/A  Patient/family educated on Medicare website which has current facility and service quality ratings: no    Education Provided on the Discharge Plan: Yes  Persons Notified of Discharge Plans: MartinCadenceie  Patient/Family in Agreement with the Plan: yes    Handoff Referral Completed: Yes    Additional Information:  Patient medically ready for discharge. Plan is for patient to discharge back to [facility]. She already has the [place] services at the [facility]. Patient will also receive [facility]. Referral placed.    [facility]  [phone] Phone  [phone] Fax    [facility]  [phone] Phone  [phone] Fax    Spoke with patient's martin Paz. Paz is in agreement with patient discharging back to [facility] with [facility]. Dicussed transport. Paz requests w/c transport and is aware that it is private pay. João arranged for w/c transport at 1530.    Quoc Tracey RN, at [facility]. She is in agreement with discharge plan.     DANTE Holloway  [facility] [phone]/ [facility] [phone]  Care Management

## 2024-05-20 NOTE — DISCHARGE SUMMARY
[facility]  Hospitalist Discharge Summary      Date of Admission:  5/19/2024  Date of Discharge:  5/20/2024  Discharging Provider: Yolanda Jauregui MD  Discharge Service: Hospitalist Service    Discharge Diagnoses   Principal Problem:    Acute respiratory failure with hypoxemia (H)  Active Problems:    Aspiration pneumonitis (H)    Congestive heart failure, unspecified HF chronicity, unspecified heart failure type (H)    Dysphagia    Elevated brain natriuretic peptide (BNP) level    Paroxysmal atrial fibrillation (H)    SSS (sick sinus syndrome) (H)    Pacemaker    Gastroesophageal reflux disease without esophagitis    Chronic pain of both shoulders    Depression    Clinically Significant Risk Factors          Follow-ups Needed After Discharge   Follow-up Appointments     Follow-up and recommended labs and tests       Follow up with hospice enrollment upon discharge back to [facility].          Discharge Disposition   Discharged to [facility] with hospice informational visit in upcoming days and possible enrollment depending on patient's goals of care   Condition at discharge: Guarded    Hospital Course   Patient is a 89-year-old-year-old female who presented with sudden onset of severe shortness of breath and increased work of breathing with witnessed aspiration event at her assisted living facility who on evaluation was found to have possible mild CHF exacerbation with elevated BNP and pleural effusion versus aspiration pneumonitis but without obvious pneumonia.  Patient was given a total of 40 mg IV Lasix and patient was hospitalized for further evaluation however was overall very uncooperative during her stay.  She became very paranoid and accused the nursing staff of kidnapping her, refused to eat and drink, refused to work with speech therapy, refused oximetry monitoring or oxygen placement and demanded to be discharged back to her her  assisted living facility.  Following extensive conversation with patient's niece who is her power of  decision was made to discharge patient back home with oxygen to be used if recurrent aspiration events with increased work of breathing is noted.  [facility] and will come out to meet with patient and niece in the upcoming days to further evaluate goals of care going forward.  Did discuss with niece that if patient becomes more cooperative at the assisted living facility setting and she is not interested in hospice, could consider home care speech therapy evaluation to find best diet going forward if ongoing selective treatment is desired.  Did recommend a puréed diet with significantly thickened liquids at time of discharge if patient was willing.  Patient is discharged home with oxygen and close clinical follow-up as well as hospice evaluation    Principal Problem:    Acute respiratory failure with hypoxemia (H)    Assessment: Patient presented with fairly sudden onset of shortness of breath of unclear etiology but with suspected aspiration event causing aspiration pneumonitis at assisted living facility but also concern for mild CHF exacerbation.  Patient was placed on 2 L nasal cannula due to work of breathing and mild hypoxemia for further evaluation of the underlying etiology.  Patient has had increasing paranoia and resistance to interventions overnight and during the day today and currently is refusing to eat, drink, take even home medications, or interact with staff and is insisting upon discharging home.  Following conversation with patient's niece, who is medical power of , will discharge home today with ongoing intermittent oxygen as required and will meet with hospice for informative consultation this week for assessment and discussion of goals of care to see if patient would qualify for hospice.  Of note patient did receive Lasix 40 mg IV but did not get  initiated on any antibiotics as there were no clinical signs of infective process and pneumonitis was suspected.    Plan:   -Discharge back to assisted living facility today with hospice informative meeting in the near future  -Continue with intermittent oxygen 1 to 3 L as needed for increased work of breathing/dyspnea following aspiration event for mild hypoxemia  -Will restart Lasix 20 mg daily upon discharge but could consider daily weights and upward titration of Lasix if weight gain and edema is noted going forward  -Continue DNR/DNI status upon discharge with further discussion regarding future goals of care in the near future    Active Problems:    Aspiration pneumonitis (H)    Assessment: Suspected based on clinical exam and patient's progress during this hospitalization.  Patient did not appear to have pneumonia development with white blood cell count remaining normal, respiratory status stable, patient afebrile however patient is at risk for aspiration development going forward.  Is currently in the middle of azithromycin prior to admission and this has been continued during the stay and will continue at time of discharge.    Plan:   -Discharge back to assisted living facility with ongoing azithromycin for course completion  -Facility to monitor for any signs of fever, worsening shortness of breath, or other signs concerning for infectious etiology onset  -Depending on goals of care strongly encourage speech therapy evaluation for identification of safest diet but for now would recommend puréed diet with nectar thickened liquids to avoid recurrent aspiration events      Congestive heart failure, unspecified HF chronicity, unspecified heart failure type (H)    Elevated brain natriuretic peptide (BNP) level    Assessment: Patient with recent diagnosis of CHF with unclear details but recent initiation with Lasix presented with elevated BNP and possible left pleural effusion.  Patient received a total of Lasix  40 mg IV.  Is now refusing all oral medications this morning    Plan:   -will get patient back to assistive living facility - nursing on site is aware patient has refused all medications including Lasix today prior to discharge back.    -depending on goals of care going forward could consider daily weights, further titration upward as appropriate on Lasix if weight gain present but for now continue lasix 20 mg daily.       Dysphagia    Assessment: Patient had previous history of dysphagia but apparently has improved and is currently on a regular diet.  Did have suspected aspiration event prior to presentation and failed bedside swallow assessment by nursing staff.  Chest x-ray shows possible left sided opacity but afebrile and normal WBC x2 in patient with improving oxygenation.  Now refusing Speech therapy evaluation - paranoid and worried about what is in the liquids being offered.      Plan:   -Continue puréed diet, nectar thickened liquids at time of discharge and recommend outpatient or home care speech therapy assessment going forward depending on goals of care      Hypokalemia    Assessment: Was not present initially but potassium did decrease slightly to 3.3 following IV fluid diuresis.  Patient declines oral or IV replacement today    Plan: Will discharge home.  Depending on goals of care going forward could consider repeat BMP later this week to evaluate for ongoing hypokalemia but hopefully as patient gets back to her home Lasix regimen and is willing to eat and drink she will have normalization      Paroxysmal atrial fibrillation (H)    Assessment: on coreg and currently in NSR.  On coumadin for anticoagulation with INR in therapeutic range on home regimen    Plan:   -discharge without change to Coreg or Coumadin dosing       SSS (sick sinus syndrome) s/p pacemacker    Assessment: on coreg, paced rhythm    Plan:   -continue Coreg at discharge      Gastroesophageal reflux disease without esophagitis     Assessment: on omeprazole without breakthrough symptoms during this stay    Plan:   -continue omeprazole at time of discharge      Chronic pain of both shoulders    Assessment: chronic but patient not complaining of pain during this stay    Plan:   -discharge without change to home regimen      Depression    Assessment: on duloxetine and normally well controlled    Plan:   -discharge without change to home regimen     Consultations This Hospital Stay   PHARMACY TO DOSE WARFARIN  SPEECH LANGUAGE PATH ADULT IP CONSULT  CARE MANAGEMENT / SOCIAL WORK IP CONSULT    Code Status   No CPR- Do NOT Intubate    Time Spent on this Encounter   I, Yolanda Jauregui MD, personally saw the patient today and spent greater than 30 minutes discharging this patient.       Yolanda Jauregui MD  [address] MEDICAL SURGICAL  [address] [place] [address]  Phone: [phone]  ______________________________________________________________________    Physical Exam   Vital Signs: Temp: 98.4  F (36.9  C) Temp src: Axillary BP: 135/70 Pulse: 86   Resp: 30 SpO2: 96 % O2 Device: Nasal cannula Oxygen Delivery: 2 LPM  Weight: 102 lbs 8.22 oz  Constitutional: awakens to voice but is very suspicious and paranoid and does not want to answer many questions asked.  Not cooperative with exam and does not believe this provider is a doctor - keeps grabbing my bag and looking at it and comparing the picture and reading the label but shaking her head in disbelief.  Much more cooperative when goal was going home but still refuses to follow directions for improved exam.    Respiratory: mild tachypnea but patient is getting anxious and seems to be hyperventilating.  RR was improved when patient was sleeping on entry into the room. Breath sounds diminished but patient is refusing to take deep breaths for exam and keeps looking at the stethoscope suspiciously   Cardiovascular: RRR  GI: bowel sounds present, no  "obvious tenderness or distention   Musculoskeletal: No edema noted  Neurologic: patient with respond to name but will not tell her name, refuses to answer questions about place, time or situation stating \"I don't know you.  Why would I give your that information?\"         Primary Care Physician   Physician No Ref-Primary    Discharge Orders      Hospice Referral      Reason for your hospital stay    Worsening shortness of breath after an aspiration event.  You are declining any speech therapy evaluation or changes in your diet and will be returning to your assistive living facility with hospice services.     Follow-up and recommended labs and tests     Follow up with hospice enrollment upon discharge back to [facility].     Activity    Your activity upon discharge: activity as tolerated     Oxygen Adult/Peds     Diet    Follow this diet upon discharge: Safest diet: Pureed Diet (level 4) Liquidized/Moderately Thick (level 3) however ongoing conversation regarding goals of care should occur and diet to be allowed as per patient's wishes and comfort.       Significant Results and Procedures   Most Recent 3 CBC's:  Recent Labs   Lab Test 05/20/24  0644 05/19/24  1057 02/26/23  1701 02/25/23  0557   WBC 6.9 10.1  --  7.3   HGB 8.2* 8.5* 9.9* 9.1*   MCV 86 88  --  102*    228  --  151     Most Recent 3 BMP's:  Recent Labs   Lab Test 05/20/24  0643 05/19/24  1057 02/27/23  0539 02/26/23  0542 02/25/23  0557    139  --   --  139   POTASSIUM 3.3* 3.9 3.7   < > 3.7   CHLORIDE 101 102  --   --  107   CO2 27 27  --   --  25   BUN 28.0* 26.8*  --   --  31.4*   CR 0.71 0.64  --   --  0.75   ANIONGAP 11 10  --   --  7   LIZETH 9.1 9.4  --   --  8.4   * 107*  --   --  85    < > = values in this interval not displayed.     Most Recent 3 INR's:  Recent Labs   Lab Test 05/20/24  0643 05/19/24  1057 06/15/23  0221   INR 2.23* 2.60* 6.83*     Most Recent 3 Troponin's:No lab results found.  Most Recent 3 " BNP's:  Recent Labs   Lab Test 05/19/24  1057   NTBNPI 3,303*   ,   Results for orders placed or performed during the hospital encounter of 05/19/24   XR Chest Port 1 View    Narrative    EXAM: XR CHEST PORTABLE 1 VIEW  LOCATION: [facility]  DATE: 05/19/2024    INDICATION: Short of air.  COMPARISON: Chest film 02/23/2023.      Impression    IMPRESSION: Opacification of the lower left thorax is new since previous study and may reflect a combination of consolidation and pleural fluid. Right lung is clear. Cardiac enlargement. Normal pulmonary vascularity. Cardiac pacemaker device is   unchanged. Severe degenerative changes of the shoulders.           Discharge Medications   Current Discharge Medication List        CONTINUE these medications which have NOT CHANGED    Details   acetaminophen (TYLENOL) 650 MG CR tablet Take 1,300 mg by mouth 2 times daily 11am and 6pm      albuterol (PROAIR HFA/PROVENTIL HFA/VENTOLIN HFA) 108 (90 Base) MCG/ACT inhaler Inhale 4 puffs into the lungs every hour as needed for shortness of breath      azithromycin (ZITHROMAX) 250 MG tablet Take 250 mg by mouth daily 8am      calcium carbonate antacid 1000 MG CHEW Take 1,000 mg by mouth daily (with dinner) 6pm      carvedilol (COREG) 12.5 MG tablet Take 12.5 mg by mouth 2 times daily (with meals) 11am and 11pm      Diaper Rash Products (A+D DIAPER RASH) CREA Apply topically as needed      DULoxetine (CYMBALTA) 30 MG capsule Take 30 mg by mouth daily at 4pm      !! furosemide (LASIX) 20 MG tablet Take 10 mg by mouth every morning 0800 scheduled and 10am as needed for weight gain (3lbs/day or 5lbs/week)      !! furosemide (LASIX) 20 MG tablet Take 10 mg by mouth daily as needed (at 10am for weight gain 3lbs/day or 5lbs/week) Takes 10mg every morning at 8am scheduled      HYDROcodone-acetaminophen (NORCO) 5-325 MG tablet Take 1 tablet by mouth nightly as needed for severe pain (7-10)  Qty: 12 tablet, Refills: 0       !! ipratropium - albuterol 0.5 mg/2.5 mg/3 mL (DUONEB) 0.5-2.5 (3) MG/3ML neb solution Take 1 vial by nebulization 2 times daily 11am and 9pm scheduled and every 2 hours as needed for shortness of breath      !! ipratropium - albuterol 0.5 mg/2.5 mg/3 mL (DUONEB) 0.5-2.5 (3) MG/3ML neb solution Take 1 vial by nebulization every 2 hours as needed for shortness of breath      isosorbide mononitrate (IMDUR) 30 MG 24 hr tablet Take 15 mg by mouth daily at 4pm      lactulose (CHRONULAC) 10 GM/15ML solution Take 30 mLs by mouth daily as needed for constipation      Lidocaine (LIDOCARE) 4 % Patch Place 1 patch onto the skin every 24 hours To prevent lidocaine toxicity, patient should be patch free for 12 hrs daily.  Qty: 15 patch, Refills: 0    Associated Diagnoses: Right knee pain, unspecified chronicity      lisinopril (ZESTRIL) 20 MG tablet Take 20 mg by mouth 2 times daily 11am and 6pm      Melatonin 10 MG CAPS Take 1 capsule by mouth daily (with dinner)      omeprazole (PRILOSEC) 20 MG DR capsule Take 20 mg by mouth 2 times daily 3pm and 6pm      polyethylene glycol (MIRALAX) 17 GM/Dose powder Take 17 g by mouth daily as needed for constipation      !! senna-docusate (SENOKOT-S/PERICOLACE) 8.6-50 MG tablet Take 2 tablets by mouth nightly as needed for constipation      !! senna-docusate (SENOKOT-S/PERICOLACE) 8.6-50 MG tablet Take 1 tablet by mouth daily at 4pm And 2 tablets at bedtime as needed for constipation      !! warfarin ANTICOAGULANT (COUMADIN) 2 MG tablet Take 2 mg by mouth At 6pm on Wednesdays, 3mg all other days of the week      !! warfarin ANTICOAGULANT (COUMADIN) 3 MG tablet Take 3 mg by mouth At 6pm on all days except Wednesdays (2mg on Wednesdays)       !! - Potential duplicate medications found. Please discuss with provider.        Allergies   Allergies   Allergen Reactions    Cefuroxime Diarrhea    Hydrochlorothiazide Other (See Comments)     hyponatremia    Nitrofurantoin Other (See Comments)      "Dizzy, headache, \"sick all over\"    Oxybutynin Other (See Comments)     Dizziness ,lightheaded, urine retention on 10 mg dose    Sulfa Antibiotics Unknown    Amoxicillin Other (See Comments)     Diarrhea     "

## 2024-05-20 NOTE — PROGRESS NOTES
Nurse to nurse report called to [facility] staff at 1504. Transport plans to be here at 1530. Angelic Turcios RN on 5/20/2024 at 3:04 PM